# Patient Record
Sex: FEMALE | Race: WHITE | NOT HISPANIC OR LATINO
[De-identification: names, ages, dates, MRNs, and addresses within clinical notes are randomized per-mention and may not be internally consistent; named-entity substitution may affect disease eponyms.]

---

## 2017-02-22 ENCOUNTER — APPOINTMENT (OUTPATIENT)
Dept: NEUROLOGY | Facility: CLINIC | Age: 32
End: 2017-02-22

## 2017-02-22 VITALS
WEIGHT: 115 LBS | SYSTOLIC BLOOD PRESSURE: 95 MMHG | HEIGHT: 60 IN | DIASTOLIC BLOOD PRESSURE: 56 MMHG | BODY MASS INDEX: 22.58 KG/M2 | HEART RATE: 82 BPM

## 2017-02-22 RX ORDER — MAGNESIUM OXIDE 241.3 MG/1000MG
400 TABLET ORAL TWICE DAILY
Qty: 60 | Refills: 1 | Status: ACTIVE | COMMUNITY
Start: 2017-02-22 | End: 1900-01-01

## 2017-04-14 ENCOUNTER — APPOINTMENT (OUTPATIENT)
Dept: INTERNAL MEDICINE | Facility: CLINIC | Age: 32
End: 2017-04-14

## 2017-04-14 VITALS
HEIGHT: 60 IN | WEIGHT: 115 LBS | BODY MASS INDEX: 22.58 KG/M2 | SYSTOLIC BLOOD PRESSURE: 120 MMHG | HEART RATE: 80 BPM | DIASTOLIC BLOOD PRESSURE: 74 MMHG | RESPIRATION RATE: 17 BRPM | TEMPERATURE: 98.2 F | OXYGEN SATURATION: 98 %

## 2017-04-24 LAB
25(OH)D3 SERPL-MCNC: 19.3 NG/ML
ALBUMIN SERPL ELPH-MCNC: 4.4 G/DL
ALP BLD-CCNC: 45 U/L
ALT SERPL-CCNC: 9 U/L
ANION GAP SERPL CALC-SCNC: 15 MMOL/L
AST SERPL-CCNC: 11 U/L
BASOPHILS # BLD AUTO: 0.02 K/UL
BASOPHILS NFR BLD AUTO: 0.3 %
BILIRUB SERPL-MCNC: 0.3 MG/DL
BUN SERPL-MCNC: 13 MG/DL
CALCIUM SERPL-MCNC: 9.8 MG/DL
CHLORIDE SERPL-SCNC: 107 MMOL/L
CHOLEST SERPL-MCNC: 184 MG/DL
CHOLEST/HDLC SERPL: 3.2 RATIO
CO2 SERPL-SCNC: 21 MMOL/L
CREAT SERPL-MCNC: 0.63 MG/DL
EOSINOPHIL # BLD AUTO: 0.11 K/UL
EOSINOPHIL NFR BLD AUTO: 1.9 %
FOLATE SERPL-MCNC: >20 NG/ML
GLUCOSE SERPL-MCNC: 86 MG/DL
HBA1C MFR BLD HPLC: 5.3 %
HCT VFR BLD CALC: 34.7 %
HDLC SERPL-MCNC: 57 MG/DL
HGB BLD-MCNC: 11.5 G/DL
IMM GRANULOCYTES NFR BLD AUTO: 0.2 %
LDLC SERPL CALC-MCNC: 119 MG/DL
LYMPHOCYTES # BLD AUTO: 1.54 K/UL
LYMPHOCYTES NFR BLD AUTO: 26.6 %
MAN DIFF?: NORMAL
MCHC RBC-ENTMCNC: 28 PG
MCHC RBC-ENTMCNC: 33.1 GM/DL
MCV RBC AUTO: 84.6 FL
MONOCYTES # BLD AUTO: 0.57 K/UL
MONOCYTES NFR BLD AUTO: 9.9 %
NEUTROPHILS # BLD AUTO: 3.53 K/UL
NEUTROPHILS NFR BLD AUTO: 61.1 %
PLATELET # BLD AUTO: 353 K/UL
POTASSIUM SERPL-SCNC: 4.4 MMOL/L
PROT SERPL-MCNC: 6.8 G/DL
RBC # BLD: 4.1 M/UL
RBC # FLD: 12.8 %
SODIUM SERPL-SCNC: 143 MMOL/L
TRIGL SERPL-MCNC: 41 MG/DL
TSH SERPL-ACNC: 1.69 UIU/ML
VIT B12 SERPL-MCNC: 312 PG/ML
WBC # FLD AUTO: 5.78 K/UL

## 2018-05-09 ENCOUNTER — APPOINTMENT (OUTPATIENT)
Dept: NEUROLOGY | Facility: CLINIC | Age: 33
End: 2018-05-09
Payer: COMMERCIAL

## 2018-05-09 VITALS
SYSTOLIC BLOOD PRESSURE: 129 MMHG | HEIGHT: 60 IN | HEART RATE: 75 BPM | WEIGHT: 118 LBS | DIASTOLIC BLOOD PRESSURE: 76 MMHG | BODY MASS INDEX: 23.16 KG/M2

## 2018-05-09 PROCEDURE — 99214 OFFICE O/P EST MOD 30 MIN: CPT

## 2019-08-14 ENCOUNTER — APPOINTMENT (OUTPATIENT)
Dept: INTERNAL MEDICINE | Facility: CLINIC | Age: 34
End: 2019-08-14

## 2019-12-06 ENCOUNTER — EMERGENCY (EMERGENCY)
Facility: HOSPITAL | Age: 34
LOS: 1 days | End: 2019-12-06
Attending: STUDENT IN AN ORGANIZED HEALTH CARE EDUCATION/TRAINING PROGRAM
Payer: COMMERCIAL

## 2019-12-06 VITALS
TEMPERATURE: 98 F | OXYGEN SATURATION: 100 % | WEIGHT: 125 LBS | HEIGHT: 60 IN | DIASTOLIC BLOOD PRESSURE: 83 MMHG | HEART RATE: 83 BPM | SYSTOLIC BLOOD PRESSURE: 134 MMHG | RESPIRATION RATE: 18 BRPM

## 2019-12-06 LAB
ANION GAP SERPL CALC-SCNC: 13 MMOL/L — SIGNIFICANT CHANGE UP (ref 5–17)
APPEARANCE UR: ABNORMAL
BASOPHILS # BLD AUTO: 0.03 K/UL — SIGNIFICANT CHANGE UP (ref 0–0.2)
BASOPHILS NFR BLD AUTO: 0.4 % — SIGNIFICANT CHANGE UP (ref 0–2)
BILIRUB UR-MCNC: NEGATIVE — SIGNIFICANT CHANGE UP
BLD GP AB SCN SERPL QL: NEGATIVE — SIGNIFICANT CHANGE UP
BUN SERPL-MCNC: 7 MG/DL — SIGNIFICANT CHANGE UP (ref 7–23)
CALCIUM SERPL-MCNC: 9.7 MG/DL — SIGNIFICANT CHANGE UP (ref 8.4–10.5)
CHLORIDE SERPL-SCNC: 102 MMOL/L — SIGNIFICANT CHANGE UP (ref 96–108)
CO2 SERPL-SCNC: 21 MMOL/L — LOW (ref 22–31)
COLOR SPEC: ABNORMAL
CREAT SERPL-MCNC: 0.51 MG/DL — SIGNIFICANT CHANGE UP (ref 0.5–1.3)
DIFF PNL FLD: ABNORMAL
EOSINOPHIL # BLD AUTO: 0.08 K/UL — SIGNIFICANT CHANGE UP (ref 0–0.5)
EOSINOPHIL NFR BLD AUTO: 1 % — SIGNIFICANT CHANGE UP (ref 0–6)
GLUCOSE SERPL-MCNC: 129 MG/DL — HIGH (ref 70–99)
GLUCOSE UR QL: NEGATIVE — SIGNIFICANT CHANGE UP
HCG SERPL-ACNC: HIGH MIU/ML
HCG UR QL: NEGATIVE — SIGNIFICANT CHANGE UP
HCT VFR BLD CALC: 30.7 % — LOW (ref 34.5–45)
HGB BLD-MCNC: 10.5 G/DL — LOW (ref 11.5–15.5)
IMM GRANULOCYTES NFR BLD AUTO: 0.2 % — SIGNIFICANT CHANGE UP (ref 0–1.5)
KETONES UR-MCNC: NEGATIVE — SIGNIFICANT CHANGE UP
LEUKOCYTE ESTERASE UR-ACNC: ABNORMAL
LYMPHOCYTES # BLD AUTO: 1.91 K/UL — SIGNIFICANT CHANGE UP (ref 1–3.3)
LYMPHOCYTES # BLD AUTO: 23 % — SIGNIFICANT CHANGE UP (ref 13–44)
MCHC RBC-ENTMCNC: 27.9 PG — SIGNIFICANT CHANGE UP (ref 27–34)
MCHC RBC-ENTMCNC: 34.2 GM/DL — SIGNIFICANT CHANGE UP (ref 32–36)
MCV RBC AUTO: 81.6 FL — SIGNIFICANT CHANGE UP (ref 80–100)
MONOCYTES # BLD AUTO: 0.75 K/UL — SIGNIFICANT CHANGE UP (ref 0–0.9)
MONOCYTES NFR BLD AUTO: 9 % — SIGNIFICANT CHANGE UP (ref 2–14)
NEUTROPHILS # BLD AUTO: 5.53 K/UL — SIGNIFICANT CHANGE UP (ref 1.8–7.4)
NEUTROPHILS NFR BLD AUTO: 66.4 % — SIGNIFICANT CHANGE UP (ref 43–77)
NITRITE UR-MCNC: NEGATIVE — SIGNIFICANT CHANGE UP
NRBC # BLD: 0 /100 WBCS — SIGNIFICANT CHANGE UP (ref 0–0)
PH UR: 7 — SIGNIFICANT CHANGE UP (ref 5–8)
PLATELET # BLD AUTO: 368 K/UL — SIGNIFICANT CHANGE UP (ref 150–400)
POTASSIUM SERPL-MCNC: 3.5 MMOL/L — SIGNIFICANT CHANGE UP (ref 3.5–5.3)
POTASSIUM SERPL-SCNC: 3.5 MMOL/L — SIGNIFICANT CHANGE UP (ref 3.5–5.3)
PROT UR-MCNC: ABNORMAL
RBC # BLD: 3.76 M/UL — LOW (ref 3.8–5.2)
RBC # FLD: 12.8 % — SIGNIFICANT CHANGE UP (ref 10.3–14.5)
RH IG SCN BLD-IMP: POSITIVE — SIGNIFICANT CHANGE UP
SODIUM SERPL-SCNC: 136 MMOL/L — SIGNIFICANT CHANGE UP (ref 135–145)
SP GR SPEC: 1.01 — SIGNIFICANT CHANGE UP (ref 1.01–1.02)
UROBILINOGEN FLD QL: NEGATIVE — SIGNIFICANT CHANGE UP
WBC # BLD: 8.32 K/UL — SIGNIFICANT CHANGE UP (ref 3.8–10.5)
WBC # FLD AUTO: 8.32 K/UL — SIGNIFICANT CHANGE UP (ref 3.8–10.5)

## 2019-12-06 PROCEDURE — 99284 EMERGENCY DEPT VISIT MOD MDM: CPT

## 2019-12-06 NOTE — ED PROVIDER NOTE - CARE PLAN
Principal Discharge DX:	Vaginal bleeding before 22 weeks gestation  Secondary Diagnosis:	UTI (urinary tract infection)

## 2019-12-06 NOTE — ED PROVIDER NOTE - PATIENT PORTAL LINK FT
You can access the FollowMyHealth Patient Portal offered by [facility] by registering at the following website: [email or web address]. By joining [employer]’s FollowMyHealth portal, you will also be able to view your health information using other applications (apps) compatible with our system.

## 2019-12-06 NOTE — ED PROVIDER NOTE - PHYSICAL EXAMINATION
I have reviewed the triage vital signs. Const: Well-nourished, Well-developed, Eyes: no conjunctival injection and no scleral icterus ENMT: Moist mucus membranes, CVS: +S1/S2, radial/DP pulse 2+ bilaterally  RESP: Unlabored respiratory effort, Clear to auscultation bilaterally GI: Nontender/Nondistended soft abdomen, no CVA tenderness MSK: Extremities w/o deformity or ttp, Psych: Awake, Alert, & Orientedx3;  Appropriate mood and affect, cooperative

## 2019-12-06 NOTE — ED ADULT NURSE NOTE - OBJECTIVE STATEMENT
33 y/o female history of uterine polyp and blocked fallopian tube presents to the ED from home c/o vaginal bleeding. Patient states that she is ten weeks pregnant. . First pregnancy ended with miscarriage. Patient states that around 1700 today she had bleeding. Patient had another episode at 6-7 weeks that resolved on its own. Denies clots with discharge. Denies fever, chills, n/v, weakness, abd pain, diarrhea/constipation, numbness/tingling, urinary s/s. Patient A&Ox3, in no respiratory distress, and denies chest pain. Abdomen soft and non distended. Non tender to palpation.

## 2019-12-06 NOTE — ED PROVIDER NOTE - NSFOLLOWUPINSTRUCTIONS_ED_ALL_ED_FT
Please take the antibiotic that you were prescribed, cephalexin (Keflex), one tablet, two times per day, for 7 days. Please take as indicated on your prescription with respect to the warnings.     Please follow up with your OBGYN as soon as possible. If you do not have one, you can make an appointment at the following location:   [facility]  Address: [address] [place] [address], 1st Floor, [place] [address]  Phone: [phone]  Fax: [phone]     Please return to the emergency department if you experience worsening symptoms, or if you develop headache, neck stiffness, fever/chills, changes in vision, chest pain, shortness of breath, difficulty breathing, palpitations, lightheadedness, weakness, dizziness, numbness, tingling, abdominal pain, nausea, vomiting, diarrhea, changes in your urine, blood in the urine, painful urination, syncope (passing out), or for any other concerns.

## 2019-12-06 NOTE — ED PROVIDER NOTE - OBJECTIVE STATEMENT
34 year old female (, x10 weeks pregnant) with pmhx uterine polyp, blocked fallopian tube and no significant pshx presents to the ED c/o vaginal bleeding starting at 1700 today. Pt follows reproductive endocrinologist, Dr. Nye, who discharged the pt to ob recently. Had artificial insemination (IUI) conducted on 10/12. Pt had bled previously during the pregnancy between weeks x6-7 but has not been bleeding for the past x12 days. During the bleeding period, the pt was confined to her bed but has recently been allowed to return to work. Denies hematuria, fever, n/v, lightheadedness, CP, SOB, abdominal pain. Denies smoking, alcohol and drug use. Pt had a natural chemical pregnancy x several months ago, miscarried. Prior US was conducted by Dr. Nye to visualize the fetus during the bleeding period, fetus developing normally. Last check-up was on .

## 2019-12-06 NOTE — ED PROVIDER NOTE - CLINICAL SUMMARY MEDICAL DECISION MAKING FREE TEXT BOX
34 year old female (, x10 weeks pregnant) with artificial insemination presents to the ED c/o vaginal bleeding x3-4 hours, no clots. Has prior vaginal bleeding which self resolved. Pt has known intrauterine pregnancy. On exam, pt non-toxic appearing, no symptoms of anemia, will send off labs and obtain US and ob consult.

## 2019-12-07 VITALS
TEMPERATURE: 99 F | HEART RATE: 74 BPM | DIASTOLIC BLOOD PRESSURE: 70 MMHG | OXYGEN SATURATION: 99 % | RESPIRATION RATE: 18 BRPM | SYSTOLIC BLOOD PRESSURE: 105 MMHG

## 2019-12-07 PROCEDURE — 85027 COMPLETE CBC AUTOMATED: CPT

## 2019-12-07 PROCEDURE — 81025 URINE PREGNANCY TEST: CPT

## 2019-12-07 PROCEDURE — 80048 BASIC METABOLIC PNL TOTAL CA: CPT

## 2019-12-07 PROCEDURE — 76815 OB US LIMITED FETUS(S): CPT

## 2019-12-07 PROCEDURE — 86850 RBC ANTIBODY SCREEN: CPT

## 2019-12-07 PROCEDURE — 76801 OB US < 14 WKS SINGLE FETUS: CPT

## 2019-12-07 PROCEDURE — 76815 OB US LIMITED FETUS(S): CPT | Mod: 26

## 2019-12-07 PROCEDURE — 86900 BLOOD TYPING SEROLOGIC ABO: CPT

## 2019-12-07 PROCEDURE — 86901 BLOOD TYPING SEROLOGIC RH(D): CPT

## 2019-12-07 PROCEDURE — 76817 TRANSVAGINAL US OBSTETRIC: CPT | Mod: 26

## 2019-12-07 PROCEDURE — 81001 URINALYSIS AUTO W/SCOPE: CPT

## 2019-12-07 PROCEDURE — 84702 CHORIONIC GONADOTROPIN TEST: CPT

## 2019-12-07 PROCEDURE — 99284 EMERGENCY DEPT VISIT MOD MDM: CPT | Mod: 25

## 2019-12-07 RX ORDER — CEPHALEXIN 500 MG
1 CAPSULE ORAL
Qty: 14 | Refills: 0
Start: 2019-12-07 | End: 2019-12-13

## 2019-12-07 RX ORDER — CEPHALEXIN 500 MG
500 CAPSULE ORAL ONCE
Refills: 0 | Status: COMPLETED | OUTPATIENT
Start: 2019-12-07 | End: 2019-12-07

## 2019-12-07 RX ADMIN — Medication 500 MILLIGRAM(S): at 01:15

## 2019-12-11 PROBLEM — N84.0 POLYP OF CORPUS UTERI: Chronic | Status: ACTIVE | Noted: 2019-12-07

## 2019-12-30 ENCOUNTER — APPOINTMENT (OUTPATIENT)
Dept: ANTEPARTUM | Facility: CLINIC | Age: 34
End: 2019-12-30
Payer: COMMERCIAL

## 2019-12-30 ENCOUNTER — ASOB RESULT (OUTPATIENT)
Age: 34
End: 2019-12-30

## 2019-12-30 PROCEDURE — 36416 COLLJ CAPILLARY BLOOD SPEC: CPT

## 2019-12-30 PROCEDURE — 76801 OB US < 14 WKS SINGLE FETUS: CPT

## 2019-12-30 PROCEDURE — 76813 OB US NUCHAL MEAS 1 GEST: CPT

## 2020-01-03 ENCOUNTER — ASOB RESULT (OUTPATIENT)
Age: 35
End: 2020-01-03

## 2020-01-03 ENCOUNTER — APPOINTMENT (OUTPATIENT)
Dept: ANTEPARTUM | Facility: CLINIC | Age: 35
End: 2020-01-03
Payer: COMMERCIAL

## 2020-01-03 PROCEDURE — 76817 TRANSVAGINAL US OBSTETRIC: CPT

## 2020-01-10 ENCOUNTER — APPOINTMENT (OUTPATIENT)
Dept: NEUROLOGY | Facility: CLINIC | Age: 35
End: 2020-01-10
Payer: COMMERCIAL

## 2020-01-10 VITALS
BODY MASS INDEX: 24.54 KG/M2 | OXYGEN SATURATION: 99 % | HEIGHT: 60 IN | WEIGHT: 125 LBS | RESPIRATION RATE: 18 BRPM | DIASTOLIC BLOOD PRESSURE: 60 MMHG | SYSTOLIC BLOOD PRESSURE: 110 MMHG | HEART RATE: 93 BPM

## 2020-01-10 PROCEDURE — 99214 OFFICE O/P EST MOD 30 MIN: CPT

## 2020-01-10 NOTE — DISCUSSION/SUMMARY
[FreeTextEntry1] : 34 W who is here for followup of her migraines. She is currently doing well. We discussed possible interventions should she have migraines during the rest of her pregnancy.\par \par More than 50% of time spent on counseling and educate patient on differential, workup, disease course, and treatment/management. Education was provided to the patient during this encounter. All questions and concerns were answered and addressed in detail. The patient verbalized understanding and agreed to plan. Patient was advised to continue to monitor for neurologic symptoms and to notify my office or go to the nearest emergency room if there are any changes. \par Side effects of the above medications were discussed in detail including but not limited to applicable black box warning and teratogenicity as appropriate. \par Patient was advised to bring previous records to my office. \par \par

## 2020-01-10 NOTE — PHYSICAL EXAM
[General Appearance - Well Developed] : well developed [General Appearance - Alert] : alert [Person] : oriented to person [Oriented To Time, Place, And Person] : oriented to person, place, and time [Place] : oriented to place [Time] : oriented to time [Short Term Intact] : short term memory intact [Span Intact] : the attention span was normal [Fluency] : fluency intact [Naming Objects] : no difficulty naming common objects [Cranial Nerves Oculomotor (III)] : extraocular motion intact [Cranial Nerves Optic (II)] : visual acuity intact bilaterally,  visual fields full to confrontation, pupils equal round and reactive to light [Current Events] : adequate knowledge of current events [Cranial Nerves Vestibulocochlear (VIII)] : hearing was intact bilaterally [Cranial Nerves Trigeminal (V)] : facial sensation intact symmetrically [Cranial Nerves Facial (VII)] : face symmetrical [Cranial Nerves Glossopharyngeal (IX)] : tongue and palate midline [Cranial Nerves Accessory (XI - Cranial And Spinal)] : head turning and shoulder shrug symmetric [Cranial Nerves Hypoglossal (XII)] : there was no tongue deviation with protrusion [Motor Strength] : muscle strength was normal in all four extremities [Motor Tone] : muscle tone was normal in all four extremities [Sensation Tactile Decrease] : light touch was intact [Coordination - Dysmetria Impaired Finger-to-Nose Bilateral] : not present [2+] : Biceps left 2+ [Full Pulse] : the pedal pulses are present [Hearing Threshold Finger Rub Not [place]] : hearing was normal [Extraocular Movements] : extraocular movements were intact [] : no rash [Abnormal Walk] : normal gait

## 2020-01-10 NOTE — HISTORY OF PRESENT ILLNESS
[FreeTextEntry1] : 34 W who has seen Dr. Blackmon in Jan. She has symptoms suggestive of migraine without aura. She never tried the topamax as she's not interested in a daily medication. Her migraines occur the first day of her menses. Sometimes the Excedrin migraine works. She thinks stress also worsens her migraines. At times she vomits and at other times the migraine lasts all day. Location: between her eyebrows. \par \par she didn't bring the MRI report that was done recently. \par \par interval history: She tried the mag oxide but it didn't help the menstrual migraines. She stopped it because she thought it was hurting her stomach. in hindsight, it may have been the excedrin migraine that she's using. She still has headaches about 2-3 a week. \par \par interval history: She is currently 14 weeks pregnant with her first child. She has been fine with exception of a migraine that lasted 5 days. We discussed safe options during pregnancy if she has migraines. \par

## 2020-01-22 ENCOUNTER — ASOB RESULT (OUTPATIENT)
Age: 35
End: 2020-01-22

## 2020-01-22 ENCOUNTER — APPOINTMENT (OUTPATIENT)
Dept: ANTEPARTUM | Facility: CLINIC | Age: 35
End: 2020-01-22
Payer: COMMERCIAL

## 2020-01-22 PROCEDURE — 76817 TRANSVAGINAL US OBSTETRIC: CPT

## 2020-01-22 PROCEDURE — 76805 OB US >/= 14 WKS SNGL FETUS: CPT

## 2020-02-21 ENCOUNTER — APPOINTMENT (OUTPATIENT)
Dept: OTOLARYNGOLOGY | Facility: CLINIC | Age: 35
End: 2020-02-21
Payer: COMMERCIAL

## 2020-02-21 VITALS
HEART RATE: 82 BPM | WEIGHT: 125 LBS | DIASTOLIC BLOOD PRESSURE: 65 MMHG | HEIGHT: 60 IN | SYSTOLIC BLOOD PRESSURE: 115 MMHG | BODY MASS INDEX: 24.54 KG/M2

## 2020-02-21 PROCEDURE — 99203 OFFICE O/P NEW LOW 30 MIN: CPT | Mod: 25

## 2020-02-21 PROCEDURE — 92567 TYMPANOMETRY: CPT

## 2020-02-21 PROCEDURE — 92557 COMPREHENSIVE HEARING TEST: CPT

## 2020-02-24 NOTE — HISTORY OF PRESENT ILLNESS
[de-identified] : 36yo female, 20 weeks pregnant, who had URI. She went to urgent care, was told she had fluid and given saline. She saw her ob who advised f/u again with urgent care and ok to take claritin. She notes that she saw an ENT and was put amoxicillin twice a day for ten days. She feels her hearing is muffled and she is worried about this. Some mild nasal congestion still but much better. Using neti pot, many showers and notes this is improving.

## 2020-02-24 NOTE — PHYSICAL EXAM
[de-identified] : right complete imelda effusion, left effusion with bubbles [de-identified] : + walter [Midline] : trachea located in midline position [Normal] : no rashes

## 2020-02-24 NOTE — CONSULT LETTER
[Dear  ___] : Dear  [unfilled], [Consult Letter:] : I had the pleasure of evaluating your patient, [unfilled]. [Consult Closing:] : Thank you very much for allowing me to participate in the care of this patient.  If you have any questions, please do not hesitate to contact me. [Please see my note below.] : Please see my note below. [FreeTextEntry3] : Dionna Valdez MD\par Otolaryngology and Cranial Base Surgery\par Attending Physician - Department of Otolaryngology and Head & Neck Surgery\par [facility]\par \par [employer] [facility]  [Sincerely,] : Sincerely,

## 2020-02-24 NOTE — ASSESSMENT
[FreeTextEntry1] : b/l BRISEIDA/CHL s/p URI:\par - reassured the natural progression of effusion after URI and that would continue to recommend conservative management for now\par - reassured the hearing loss is conductive bc of the fluid and should resolve once fluid clears\par - continue neti pot and antihistamine that her OB recommended\par - f/u in 2-3 weeks to recheck and if persistent effusion can consider in office myringotomy +/- tube as long as her OB oks use of phenol or tetracaine\par

## 2020-02-26 ENCOUNTER — ASOB RESULT (OUTPATIENT)
Age: 35
End: 2020-02-26

## 2020-02-26 ENCOUNTER — APPOINTMENT (OUTPATIENT)
Dept: ANTEPARTUM | Facility: CLINIC | Age: 35
End: 2020-02-26
Payer: COMMERCIAL

## 2020-02-26 PROCEDURE — 76817 TRANSVAGINAL US OBSTETRIC: CPT | Mod: 59

## 2020-02-26 PROCEDURE — 76811 OB US DETAILED SNGL FETUS: CPT

## 2020-03-04 ENCOUNTER — APPOINTMENT (OUTPATIENT)
Dept: ANTEPARTUM | Facility: CLINIC | Age: 35
End: 2020-03-04
Payer: COMMERCIAL

## 2020-03-04 ENCOUNTER — ASOB RESULT (OUTPATIENT)
Age: 35
End: 2020-03-04

## 2020-03-04 PROCEDURE — 76817 TRANSVAGINAL US OBSTETRIC: CPT

## 2020-03-04 PROCEDURE — 76815 OB US LIMITED FETUS(S): CPT

## 2020-03-11 ENCOUNTER — APPOINTMENT (OUTPATIENT)
Dept: OTOLARYNGOLOGY | Facility: CLINIC | Age: 35
End: 2020-03-11

## 2020-03-11 ENCOUNTER — APPOINTMENT (OUTPATIENT)
Dept: OTOLARYNGOLOGY | Facility: CLINIC | Age: 35
End: 2020-03-11
Payer: COMMERCIAL

## 2020-03-11 VITALS
SYSTOLIC BLOOD PRESSURE: 91 MMHG | BODY MASS INDEX: 24.54 KG/M2 | DIASTOLIC BLOOD PRESSURE: 61 MMHG | HEIGHT: 60 IN | HEART RATE: 64 BPM | WEIGHT: 125 LBS

## 2020-03-11 PROCEDURE — 99213 OFFICE O/P EST LOW 20 MIN: CPT | Mod: 25

## 2020-03-11 PROCEDURE — 92557 COMPREHENSIVE HEARING TEST: CPT

## 2020-03-11 PROCEDURE — 92567 TYMPANOMETRY: CPT

## 2020-03-11 PROCEDURE — G0268 REMOVAL OF IMPACTED WAX MD: CPT

## 2020-03-11 NOTE — ASSESSMENT
[FreeTextEntry1] : BRISEIDA - resolved.\par - Continue sinus wash for pregnancy rhinitis\par - reviewed audio with patient and reassured that hearing back to normal\par - f/u after pregnancy if any symptoms persist or if new symptoms arise

## 2020-03-11 NOTE — HISTORY OF PRESENT ILLNESS
[de-identified] : 34 y/o F, 23 weeks pregnant.  At last visit had b/l BRISEIDA with conductive loss and type B tymps.   Using sinus wash.  Notes b/l ears feel much better.  Hearing is much improved.  Notes now can pop her ears.  Also now has left pulsatile tinnitus.

## 2020-03-31 ENCOUNTER — APPOINTMENT (OUTPATIENT)
Dept: ANTEPARTUM | Facility: CLINIC | Age: 35
End: 2020-03-31
Payer: COMMERCIAL

## 2020-03-31 ENCOUNTER — ASOB RESULT (OUTPATIENT)
Age: 35
End: 2020-03-31

## 2020-03-31 PROCEDURE — 76816 OB US FOLLOW-UP PER FETUS: CPT

## 2020-04-28 ENCOUNTER — APPOINTMENT (OUTPATIENT)
Dept: ANTEPARTUM | Facility: CLINIC | Age: 35
End: 2020-04-28
Payer: COMMERCIAL

## 2020-04-28 ENCOUNTER — ASOB RESULT (OUTPATIENT)
Age: 35
End: 2020-04-28

## 2020-04-28 PROCEDURE — 76819 FETAL BIOPHYS PROFIL W/O NST: CPT

## 2020-04-28 PROCEDURE — 76816 OB US FOLLOW-UP PER FETUS: CPT

## 2020-05-26 ENCOUNTER — APPOINTMENT (OUTPATIENT)
Dept: ANTEPARTUM | Facility: CLINIC | Age: 35
End: 2020-05-26
Payer: COMMERCIAL

## 2020-05-26 ENCOUNTER — ASOB RESULT (OUTPATIENT)
Age: 35
End: 2020-05-26

## 2020-05-26 PROCEDURE — 76816 OB US FOLLOW-UP PER FETUS: CPT

## 2020-06-27 ENCOUNTER — TRANSCRIPTION ENCOUNTER (OUTPATIENT)
Age: 35
End: 2020-06-27

## 2020-06-28 ENCOUNTER — INPATIENT (INPATIENT)
Facility: HOSPITAL | Age: 35
LOS: 2 days | Discharge: HOME | End: 2020-07-01
Attending: OBSTETRICS & GYNECOLOGY | Admitting: OBSTETRICS & GYNECOLOGY
Payer: COMMERCIAL

## 2020-06-28 VITALS — WEIGHT: 127.87 LBS | HEIGHT: 60 IN

## 2020-06-28 DIAGNOSIS — Z34.80 ENCOUNTER FOR SUPERVISION OF OTHER NORMAL PREGNANCY, UNSPECIFIED TRIMESTER: ICD-10-CM

## 2020-06-28 DIAGNOSIS — O26.899 OTHER SPECIFIED PREGNANCY RELATED CONDITIONS, UNSPECIFIED TRIMESTER: ICD-10-CM

## 2020-06-28 DIAGNOSIS — Z3A.00 WEEKS OF GESTATION OF PREGNANCY NOT SPECIFIED: ICD-10-CM

## 2020-06-28 LAB
BASOPHILS # BLD AUTO: 0.02 K/UL — SIGNIFICANT CHANGE UP (ref 0–0.2)
BASOPHILS NFR BLD AUTO: 0.2 % — SIGNIFICANT CHANGE UP (ref 0–2)
BLD GP AB SCN SERPL QL: NEGATIVE — SIGNIFICANT CHANGE UP
EOSINOPHIL # BLD AUTO: 0.06 K/UL — SIGNIFICANT CHANGE UP (ref 0–0.5)
EOSINOPHIL NFR BLD AUTO: 0.5 % — SIGNIFICANT CHANGE UP (ref 0–6)
HCT VFR BLD CALC: 31.5 % — LOW (ref 34.5–45)
HGB BLD-MCNC: 11.3 G/DL — LOW (ref 11.5–15.5)
IMM GRANULOCYTES NFR BLD AUTO: 0.6 % — SIGNIFICANT CHANGE UP (ref 0–1.5)
LYMPHOCYTES # BLD AUTO: 16.8 % — SIGNIFICANT CHANGE UP (ref 13–44)
LYMPHOCYTES # BLD AUTO: 2.09 K/UL — SIGNIFICANT CHANGE UP (ref 1–3.3)
MCHC RBC-ENTMCNC: 31.4 PG — SIGNIFICANT CHANGE UP (ref 27–34)
MCHC RBC-ENTMCNC: 35.9 GM/DL — SIGNIFICANT CHANGE UP (ref 32–36)
MCV RBC AUTO: 87.5 FL — SIGNIFICANT CHANGE UP (ref 80–100)
MONOCYTES # BLD AUTO: 1.41 K/UL — HIGH (ref 0–0.9)
MONOCYTES NFR BLD AUTO: 11.4 % — SIGNIFICANT CHANGE UP (ref 2–14)
NEUTROPHILS # BLD AUTO: 8.76 K/UL — HIGH (ref 1.8–7.4)
NEUTROPHILS NFR BLD AUTO: 70.5 % — SIGNIFICANT CHANGE UP (ref 43–77)
NRBC # BLD: 0 /100 WBCS — SIGNIFICANT CHANGE UP (ref 0–0)
PLATELET # BLD AUTO: 212 K/UL — SIGNIFICANT CHANGE UP (ref 150–400)
RBC # BLD: 3.6 M/UL — LOW (ref 3.8–5.2)
RBC # FLD: 14.2 % — SIGNIFICANT CHANGE UP (ref 10.3–14.5)
RH IG SCN BLD-IMP: POSITIVE — SIGNIFICANT CHANGE UP
SARS-COV-2 RNA SPEC QL NAA+PROBE: SIGNIFICANT CHANGE UP
WBC # BLD: 12.41 K/UL — HIGH (ref 3.8–10.5)
WBC # FLD AUTO: 12.41 K/UL — HIGH (ref 3.8–10.5)

## 2020-06-28 RX ORDER — SODIUM CHLORIDE 9 MG/ML
1000 INJECTION, SOLUTION INTRAVENOUS
Refills: 0 | Status: DISCONTINUED | OUTPATIENT
Start: 2020-06-28 | End: 2020-06-29

## 2020-06-28 RX ORDER — CITRIC ACID/SODIUM CITRATE 300-500 MG
15 SOLUTION, ORAL ORAL EVERY 6 HOURS
Refills: 0 | Status: DISCONTINUED | OUTPATIENT
Start: 2020-06-28 | End: 2020-06-29

## 2020-06-28 RX ORDER — OXYTOCIN 10 UNIT/ML
333.33 VIAL (ML) INJECTION
Qty: 20 | Refills: 0 | Status: DISCONTINUED | OUTPATIENT
Start: 2020-06-28 | End: 2020-06-29

## 2020-06-28 RX ADMIN — SODIUM CHLORIDE 125 MILLILITER(S): 9 INJECTION, SOLUTION INTRAVENOUS at 21:55

## 2020-06-28 RX ADMIN — SODIUM CHLORIDE 125 MILLILITER(S): 9 INJECTION, SOLUTION INTRAVENOUS at 21:54

## 2020-06-28 NOTE — PRE-ANESTHESIA EVALUATION ADULT - NSANTHOSAYNRD_GEN_A_CORE
No. ANASTASIIA screening performed.  STOP BANG Legend: 0-2 = LOW Risk; 3-4 = INTERMEDIATE Risk; 5-8 = HIGH Risk

## 2020-06-29 ENCOUNTER — TRANSCRIPTION ENCOUNTER (OUTPATIENT)
Age: 35
End: 2020-06-29

## 2020-06-29 LAB
HCT VFR BLD CALC: 28.5 % — LOW (ref 34.5–45)
HGB BLD-MCNC: 9.4 G/DL — LOW (ref 11.5–15.5)
MCHC RBC-ENTMCNC: 29.3 PG — SIGNIFICANT CHANGE UP (ref 27–34)
MCHC RBC-ENTMCNC: 33 GM/DL — SIGNIFICANT CHANGE UP (ref 32–36)
MCV RBC AUTO: 88.8 FL — SIGNIFICANT CHANGE UP (ref 80–100)
NRBC # BLD: 0 /100 WBCS — SIGNIFICANT CHANGE UP (ref 0–0)
PLATELET # BLD AUTO: 236 K/UL — SIGNIFICANT CHANGE UP (ref 150–400)
RBC # BLD: 3.21 M/UL — LOW (ref 3.8–5.2)
RBC # FLD: 13.7 % — SIGNIFICANT CHANGE UP (ref 10.3–14.5)
SARS-COV-2 IGG SERPL QL IA: NEGATIVE — SIGNIFICANT CHANGE UP
SARS-COV-2 IGM SERPL IA-ACNC: 0.37 RATIO — SIGNIFICANT CHANGE UP
T PALLIDUM AB TITR SER: NEGATIVE — SIGNIFICANT CHANGE UP
WBC # BLD: 19.66 K/UL — HIGH (ref 3.8–10.5)
WBC # FLD AUTO: 19.66 K/UL — HIGH (ref 3.8–10.5)

## 2020-06-29 PROCEDURE — 88307 TISSUE EXAM BY PATHOLOGIST: CPT | Mod: 26

## 2020-06-29 RX ORDER — OXYCODONE HYDROCHLORIDE 5 MG/1
5 TABLET ORAL ONCE
Refills: 0 | Status: DISCONTINUED | OUTPATIENT
Start: 2020-06-29 | End: 2020-07-01

## 2020-06-29 RX ORDER — DEXAMETHASONE 0.5 MG/5ML
4 ELIXIR ORAL EVERY 6 HOURS
Refills: 0 | Status: DISCONTINUED | OUTPATIENT
Start: 2020-06-29 | End: 2020-07-01

## 2020-06-29 RX ORDER — TETANUS TOXOID, REDUCED DIPHTHERIA TOXOID AND ACELLULAR PERTUSSIS VACCINE, ADSORBED 5; 2.5; 8; 8; 2.5 [IU]/.5ML; [IU]/.5ML; UG/.5ML; UG/.5ML; UG/.5ML
0.5 SUSPENSION INTRAMUSCULAR ONCE
Refills: 0 | Status: DISCONTINUED | OUTPATIENT
Start: 2020-06-29 | End: 2020-07-01

## 2020-06-29 RX ORDER — HEPARIN SODIUM 5000 [USP'U]/ML
5000 INJECTION INTRAVENOUS; SUBCUTANEOUS EVERY 12 HOURS
Refills: 0 | Status: DISCONTINUED | OUTPATIENT
Start: 2020-06-29 | End: 2020-07-01

## 2020-06-29 RX ORDER — IBUPROFEN 200 MG
600 TABLET ORAL EVERY 6 HOURS
Refills: 0 | Status: COMPLETED | OUTPATIENT
Start: 2020-06-29 | End: 2021-05-28

## 2020-06-29 RX ORDER — OXYCODONE HYDROCHLORIDE 5 MG/1
10 TABLET ORAL
Refills: 0 | Status: DISCONTINUED | OUTPATIENT
Start: 2020-06-29 | End: 2020-06-29

## 2020-06-29 RX ORDER — ONDANSETRON 8 MG/1
4 TABLET, FILM COATED ORAL EVERY 6 HOURS
Refills: 0 | Status: DISCONTINUED | OUTPATIENT
Start: 2020-06-29 | End: 2020-07-01

## 2020-06-29 RX ORDER — MAGNESIUM HYDROXIDE 400 MG/1
30 TABLET, CHEWABLE ORAL
Refills: 0 | Status: DISCONTINUED | OUTPATIENT
Start: 2020-06-29 | End: 2020-07-01

## 2020-06-29 RX ORDER — KETOROLAC TROMETHAMINE 30 MG/ML
30 SYRINGE (ML) INJECTION EVERY 6 HOURS
Refills: 0 | Status: DISCONTINUED | OUTPATIENT
Start: 2020-06-29 | End: 2020-06-30

## 2020-06-29 RX ORDER — OXYTOCIN 10 UNIT/ML
333.33 VIAL (ML) INJECTION
Qty: 20 | Refills: 0 | Status: DISCONTINUED | OUTPATIENT
Start: 2020-06-29 | End: 2020-07-01

## 2020-06-29 RX ORDER — NALOXONE HYDROCHLORIDE 4 MG/.1ML
0.1 SPRAY NASAL
Refills: 0 | Status: DISCONTINUED | OUTPATIENT
Start: 2020-06-29 | End: 2020-07-01

## 2020-06-29 RX ORDER — IBUPROFEN 200 MG
1 TABLET ORAL
Qty: 0 | Refills: 0 | DISCHARGE
Start: 2020-06-29

## 2020-06-29 RX ORDER — OXYCODONE HYDROCHLORIDE 5 MG/1
5 TABLET ORAL
Refills: 0 | Status: DISCONTINUED | OUTPATIENT
Start: 2020-06-29 | End: 2020-06-29

## 2020-06-29 RX ORDER — SIMETHICONE 80 MG/1
80 TABLET, CHEWABLE ORAL EVERY 4 HOURS
Refills: 0 | Status: DISCONTINUED | OUTPATIENT
Start: 2020-06-29 | End: 2020-07-01

## 2020-06-29 RX ORDER — ACETAMINOPHEN 500 MG
3 TABLET ORAL
Qty: 0 | Refills: 0 | DISCHARGE
Start: 2020-06-29

## 2020-06-29 RX ORDER — SIMETHICONE 80 MG/1
1 TABLET, CHEWABLE ORAL
Qty: 0 | Refills: 0 | DISCHARGE
Start: 2020-06-29

## 2020-06-29 RX ORDER — SODIUM CHLORIDE 9 MG/ML
1000 INJECTION, SOLUTION INTRAVENOUS
Refills: 0 | Status: DISCONTINUED | OUTPATIENT
Start: 2020-06-29 | End: 2020-07-01

## 2020-06-29 RX ORDER — MORPHINE SULFATE 50 MG/1
2 CAPSULE, EXTENDED RELEASE ORAL ONCE
Refills: 0 | Status: DISCONTINUED | OUTPATIENT
Start: 2020-06-29 | End: 2020-06-30

## 2020-06-29 RX ORDER — DIPHENHYDRAMINE HCL 50 MG
25 CAPSULE ORAL EVERY 6 HOURS
Refills: 0 | Status: DISCONTINUED | OUTPATIENT
Start: 2020-06-29 | End: 2020-07-01

## 2020-06-29 RX ORDER — LANOLIN
1 OINTMENT (GRAM) TOPICAL EVERY 6 HOURS
Refills: 0 | Status: DISCONTINUED | OUTPATIENT
Start: 2020-06-29 | End: 2020-07-01

## 2020-06-29 RX ORDER — ACETAMINOPHEN 500 MG
975 TABLET ORAL
Refills: 0 | Status: DISCONTINUED | OUTPATIENT
Start: 2020-06-29 | End: 2020-07-01

## 2020-06-29 RX ORDER — OXYCODONE HYDROCHLORIDE 5 MG/1
5 TABLET ORAL
Refills: 0 | Status: DISCONTINUED | OUTPATIENT
Start: 2020-06-29 | End: 2020-07-01

## 2020-06-29 RX ADMIN — Medication 30 MILLIGRAM(S): at 05:00

## 2020-06-29 RX ADMIN — Medication 975 MILLIGRAM(S): at 20:55

## 2020-06-29 RX ADMIN — SODIUM CHLORIDE 125 MILLILITER(S): 9 INJECTION, SOLUTION INTRAVENOUS at 02:24

## 2020-06-29 RX ADMIN — Medication 1000 MILLIUNIT(S)/MIN: at 05:55

## 2020-06-29 RX ADMIN — Medication 30 MILLIGRAM(S): at 18:22

## 2020-06-29 RX ADMIN — Medication 975 MILLIGRAM(S): at 15:12

## 2020-06-29 RX ADMIN — Medication 30 MILLIGRAM(S): at 12:12

## 2020-06-29 RX ADMIN — HEPARIN SODIUM 5000 UNIT(S): 5000 INJECTION INTRAVENOUS; SUBCUTANEOUS at 16:40

## 2020-06-29 NOTE — DISCHARGE NOTE OB - CARE PLAN
Principal Discharge DX:	 delivery delivered  Goal:	recovery  Assessment and plan of treatment:	diet and activities as discussed and tolerated;  please call office to schedule postpartum appointment 5 weeks after delivery or earlier if needed

## 2020-06-29 NOTE — DISCHARGE NOTE OB - HOSPITAL COURSE
Pt underwent a primary  section please refer to delivery summary and operative report for details.  Pt did well postpartum.  She was voiding, ambulating and tolerating regular diet.  Her pain is well controlled and she remained afebrile.    Pt was discharged home on POD 2 in stable condition Pt underwent a primary  section please refer to delivery summary and operative report for details.  Pt did well postpartum.  She was voiding, ambulating and tolerating regular diet.  Her pain is well controlled and she remained afebrile.  Hgb 7.2 on POD #1, then increased to 7.5 on POD#2. She remained with out any symptoms of anemia.  Pt was discharged home on POD 2 in stable condition

## 2020-06-29 NOTE — DISCHARGE NOTE OB - MEDICATION SUMMARY - MEDICATIONS TO TAKE
I will START or STAY ON the medications listed below when I get home from the hospital:    ibuprofen 600 mg oral tablet  -- 1 tab(s) by mouth every 6 hours  -- Indication: For pain    acetaminophen 325 mg oral tablet  -- 3 tab(s) by mouth   -- Indication: For pain    Prenatal Multivitamins oral tablet  -- Indication: For postpartum    simethicone 80 mg oral tablet, chewable  -- 1 tab(s) by mouth every 4 hours, As needed, Gas  -- Indication: For gas pain

## 2020-06-29 NOTE — DISCHARGE NOTE OB - PLAN OF CARE
recovery diet and activities as discussed and tolerated;  please call office to schedule postpartum appointment 5 weeks after delivery or earlier if needed

## 2020-06-29 NOTE — DISCHARGE NOTE OB - PATIENT PORTAL LINK FT
You can access the FollowMyHealth Patient Portal offered by [facility] by registering at the following website: [email or web address]. By joining [employer]’s FollowMyHealth portal, you will also be able to view your health information using other applications (apps) compatible with our system.

## 2020-06-30 LAB
BASOPHILS # BLD AUTO: 0.02 K/UL — SIGNIFICANT CHANGE UP (ref 0–0.2)
BASOPHILS NFR BLD AUTO: 0.1 % — SIGNIFICANT CHANGE UP (ref 0–2)
EOSINOPHIL # BLD AUTO: 0.12 K/UL — SIGNIFICANT CHANGE UP (ref 0–0.5)
EOSINOPHIL NFR BLD AUTO: 0.8 % — SIGNIFICANT CHANGE UP (ref 0–6)
HCT VFR BLD CALC: 21.2 % — LOW (ref 34.5–45)
HGB BLD-MCNC: 7.2 G/DL — LOW (ref 11.5–15.5)
IMM GRANULOCYTES NFR BLD AUTO: 0.5 % — SIGNIFICANT CHANGE UP (ref 0–1.5)
LYMPHOCYTES # BLD AUTO: 13.5 % — SIGNIFICANT CHANGE UP (ref 13–44)
LYMPHOCYTES # BLD AUTO: 2.04 K/UL — SIGNIFICANT CHANGE UP (ref 1–3.3)
MCHC RBC-ENTMCNC: 30 PG — SIGNIFICANT CHANGE UP (ref 27–34)
MCHC RBC-ENTMCNC: 34 GM/DL — SIGNIFICANT CHANGE UP (ref 32–36)
MCV RBC AUTO: 88.3 FL — SIGNIFICANT CHANGE UP (ref 80–100)
MONOCYTES # BLD AUTO: 1.09 K/UL — HIGH (ref 0–0.9)
MONOCYTES NFR BLD AUTO: 7.2 % — SIGNIFICANT CHANGE UP (ref 2–14)
NEUTROPHILS # BLD AUTO: 11.76 K/UL — HIGH (ref 1.8–7.4)
NEUTROPHILS NFR BLD AUTO: 77.9 % — HIGH (ref 43–77)
NRBC # BLD: 0 /100 WBCS — SIGNIFICANT CHANGE UP (ref 0–0)
PLATELET # BLD AUTO: 160 K/UL — SIGNIFICANT CHANGE UP (ref 150–400)
RBC # BLD: 2.4 M/UL — LOW (ref 3.8–5.2)
RBC # FLD: 14.2 % — SIGNIFICANT CHANGE UP (ref 10.3–14.5)
WBC # BLD: 15.1 K/UL — HIGH (ref 3.8–10.5)
WBC # FLD AUTO: 15.1 K/UL — HIGH (ref 3.8–10.5)

## 2020-06-30 RX ORDER — IBUPROFEN 200 MG
600 TABLET ORAL EVERY 6 HOURS
Refills: 0 | Status: DISCONTINUED | OUTPATIENT
Start: 2020-06-30 | End: 2020-07-01

## 2020-06-30 RX ORDER — SENNA PLUS 8.6 MG/1
2 TABLET ORAL AT BEDTIME
Refills: 0 | Status: DISCONTINUED | OUTPATIENT
Start: 2020-06-30 | End: 2020-07-01

## 2020-06-30 RX ORDER — ASCORBIC ACID 60 MG
500 TABLET,CHEWABLE ORAL THREE TIMES A DAY
Refills: 0 | Status: DISCONTINUED | OUTPATIENT
Start: 2020-06-30 | End: 2020-07-01

## 2020-06-30 RX ORDER — FERROUS SULFATE 325(65) MG
325 TABLET ORAL THREE TIMES A DAY
Refills: 0 | Status: DISCONTINUED | OUTPATIENT
Start: 2020-06-30 | End: 2020-07-01

## 2020-06-30 RX ADMIN — Medication 500 MILLIGRAM(S): at 14:37

## 2020-06-30 RX ADMIN — Medication 30 MILLIGRAM(S): at 06:02

## 2020-06-30 RX ADMIN — Medication 600 MILLIGRAM(S): at 13:05

## 2020-06-30 RX ADMIN — HEPARIN SODIUM 5000 UNIT(S): 5000 INJECTION INTRAVENOUS; SUBCUTANEOUS at 06:01

## 2020-06-30 RX ADMIN — Medication 975 MILLIGRAM(S): at 21:39

## 2020-06-30 RX ADMIN — Medication 975 MILLIGRAM(S): at 10:19

## 2020-06-30 RX ADMIN — SENNA PLUS 2 TABLET(S): 8.6 TABLET ORAL at 21:39

## 2020-06-30 RX ADMIN — Medication 325 MILLIGRAM(S): at 21:39

## 2020-06-30 RX ADMIN — Medication 30 MILLIGRAM(S): at 00:08

## 2020-06-30 RX ADMIN — Medication 975 MILLIGRAM(S): at 02:49

## 2020-06-30 RX ADMIN — Medication 1 TABLET(S): at 14:36

## 2020-06-30 RX ADMIN — Medication 975 MILLIGRAM(S): at 14:37

## 2020-06-30 RX ADMIN — Medication 325 MILLIGRAM(S): at 14:36

## 2020-06-30 RX ADMIN — HEPARIN SODIUM 5000 UNIT(S): 5000 INJECTION INTRAVENOUS; SUBCUTANEOUS at 18:02

## 2020-06-30 RX ADMIN — Medication 500 MILLIGRAM(S): at 21:39

## 2020-06-30 NOTE — PROGRESS NOTE ADULT - ASSESSMENT
35y  POD # 1 S/P  primary   section, doing well.    PMHx:  Uterine polyp s/p polypectomy  Current Issues: None

## 2020-06-30 NOTE — PROGRESS NOTE ADULT - SUBJECTIVE AND OBJECTIVE BOX
Postpartum Note-  Section POD#1    Allergies  No Known Allergies    Intolerances  Denies    Rubella: Immune                      RPR: Nonreactive                    Blood Type: A+    S:Patient is a  35y     POD#1 S/P  primary C/Sec  Patient w/o complaints, pain is controlled.  Pt is OOB, tolerating PO, passing flatus. Lochia WNL.   Feeding: Breast feeding    O:  Vital Signs Last 24 Hrs  T(C): 36.5 (2020 06:14), Max: 37.2 (2020 08:45)  T(F): 97.7 (2020 06:14), Max: 98.9 (2020 08:45)  HR: 73 (2020 06:14) (67 - 73)  BP: 96/59 (2020 06:14) (96/59 - 116/77)  BP(mean): --  RR: 18 (2020 06:14) (18 - 18)  SpO2: 98% (2020 06:14) (98% - 98%)  I&O's Summary    2020 07:01  -  2020 07:00  --------------------------------------------------------  IN: 0 mL / OUT: 1100 mL / NET: -1100 mL        Gen: NAD  CV: rrr s1s2, CTABL  Abdomen: Soft, nontender, non-distended, fundus firm.  Bowel sounds x 4 quadrants  Incision: Clean, dry, and intact.  Negative erythema/edema/ecchymosis   Sub Q  Lochia WNL  Ext: PAS in place. Negative Homans B/L.  Pedal pulses palpated B/L    LABS:                          7.2    15.10 )-----------( 160      ( 2020 06:12 )             21.2       A/P:  35y  POD # 1 S/P  primary   section, doing well.    PMHx:  Uterine polyp s/p polypectomy  Current Issues: None    Increase OOB  Renew IVF  Renew PCEA  DVT ppx  Dressing removed  Due to void  Regular diet  AM CBC  Routine Postpartum/Post-op care    Liza Mireles PA-C

## 2020-06-30 NOTE — CHART NOTE - NSCHARTNOTEFT_GEN_A_CORE
PA NOTE     POD#1    Vital Signs Last 24 Hrs  T(C): 37 (2020 08:30), Max: 37 (2020 17:00)  T(F): 98.6 (2020 08:30), Max: 98.6 (2020 17:00)  HR: 70 (2020 08:30) (67 - 73)  BP: 91/57 (2020 08:30) (91/57 - 113/71)  BP(mean): --  RR: 18 (2020 08:30) (18 - 18)  SpO2: 98% (2020 06:14) (98% - 98%)                          7.2    15.10 )-----------( 160      ( 2020 06:12 )             21.2     7.2/21.2    Anemia 2'2 acute blood loss at time of . Not requiring blood transfusion.    Plan:  - Ferrous Sulfate, Colace, Vitamin C supplementation.  - Monitor for signs/symptoms of anemia.     Liza Mireles PA-C

## 2020-07-01 VITALS
SYSTOLIC BLOOD PRESSURE: 115 MMHG | HEART RATE: 81 BPM | RESPIRATION RATE: 18 BRPM | DIASTOLIC BLOOD PRESSURE: 73 MMHG | TEMPERATURE: 98 F | OXYGEN SATURATION: 98 %

## 2020-07-01 LAB
HCT VFR BLD CALC: 22.2 % — LOW (ref 34.5–45)
HGB BLD-MCNC: 7.5 G/DL — LOW (ref 11.5–15.5)
MCHC RBC-ENTMCNC: 30.1 PG — SIGNIFICANT CHANGE UP (ref 27–34)
MCHC RBC-ENTMCNC: 33.8 GM/DL — SIGNIFICANT CHANGE UP (ref 32–36)
MCV RBC AUTO: 89.2 FL — SIGNIFICANT CHANGE UP (ref 80–100)
NRBC # BLD: 0 /100 WBCS — SIGNIFICANT CHANGE UP (ref 0–0)
PLATELET # BLD AUTO: 205 K/UL — SIGNIFICANT CHANGE UP (ref 150–400)
RBC # BLD: 2.49 M/UL — LOW (ref 3.8–5.2)
RBC # FLD: 14.6 % — HIGH (ref 10.3–14.5)
WBC # BLD: 11.9 K/UL — HIGH (ref 3.8–10.5)
WBC # FLD AUTO: 11.9 K/UL — HIGH (ref 3.8–10.5)

## 2020-07-01 PROCEDURE — 88307 TISSUE EXAM BY PATHOLOGIST: CPT

## 2020-07-01 PROCEDURE — G0463: CPT

## 2020-07-01 PROCEDURE — 86923 COMPATIBILITY TEST ELECTRIC: CPT

## 2020-07-01 PROCEDURE — 59050 FETAL MONITOR W/REPORT: CPT

## 2020-07-01 PROCEDURE — 86780 TREPONEMA PALLIDUM: CPT

## 2020-07-01 PROCEDURE — 59025 FETAL NON-STRESS TEST: CPT

## 2020-07-01 PROCEDURE — 85027 COMPLETE CBC AUTOMATED: CPT

## 2020-07-01 PROCEDURE — 86900 BLOOD TYPING SEROLOGIC ABO: CPT

## 2020-07-01 PROCEDURE — 86850 RBC ANTIBODY SCREEN: CPT

## 2020-07-01 PROCEDURE — P9016: CPT

## 2020-07-01 PROCEDURE — 86901 BLOOD TYPING SEROLOGIC RH(D): CPT

## 2020-07-01 PROCEDURE — 86769 SARS-COV-2 COVID-19 ANTIBODY: CPT

## 2020-07-01 RX ADMIN — Medication 600 MILLIGRAM(S): at 12:10

## 2020-07-01 RX ADMIN — Medication 975 MILLIGRAM(S): at 03:53

## 2020-07-01 RX ADMIN — Medication 600 MILLIGRAM(S): at 00:12

## 2020-07-01 RX ADMIN — HEPARIN SODIUM 5000 UNIT(S): 5000 INJECTION INTRAVENOUS; SUBCUTANEOUS at 06:02

## 2020-07-01 RX ADMIN — Medication 1 TABLET(S): at 12:10

## 2020-07-01 RX ADMIN — Medication 975 MILLIGRAM(S): at 10:42

## 2020-07-01 RX ADMIN — Medication 325 MILLIGRAM(S): at 12:10

## 2020-07-01 RX ADMIN — Medication 500 MILLIGRAM(S): at 12:10

## 2020-07-01 RX ADMIN — Medication 600 MILLIGRAM(S): at 06:04

## 2020-07-01 NOTE — PROGRESS NOTE ADULT - ATTENDING COMMENTS
Patient examined at bedside        Asymptomatic anemia- discussed signs and symptoms to watch for. Pain well managed. Likely discharge in am.             7.2    15.10 )-----------( 160      ( 30 Jun 2020 06:12 )             21.2
Pt seen and examined.  agree with note above.  Pt reports no lightheadedness, no dizziness, no sob, no cp.  ambulating to the bathroom without difficulty  pt doing well  routine PP care.    had long discussion with patient and husb on increasing iron intake through supplements and diet. Both expressed understanding. Both understood when to call md. Will set up telehealth visit on friday

## 2020-07-01 NOTE — PROGRESS NOTE ADULT - SUBJECTIVE AND OBJECTIVE BOX
Postpartum Note-  Section POD#2      Rubella IgG:    Immune                  RPR:              Negative         Blood Type:    A+    S:Patient is a  35y G 2   P 2   POD#2 S/P C/Sec  Subjective: Patient w/o complaints, pain is controlled.  Pt is OOB, tolerating PO, passing flatus, and voiding. Lochia WNL.   Feeding: Breastfeeding    O:  Vital Signs Last 24 Hrs  T(C): 36.6 (2020 05:28), Max: 37 (2020 08:30)  T(F): 97.9 (2020 05:28), Max: 98.6 (2020 08:30)  HR: 75 (2020 05:28) (64 - 75)  BP: 107/70 (2020 05:28) (91/57 - 114/73)  BP(mean): --  RR: 18 (2020 05:28) (17 - 18)  SpO2: 98% (2020 05:28) (97% - 98%)      Gen: NAD  CV: rrr s1s2, CTABL  Abdomen: Soft, nontender, non distended, fundus firm.  Bowel Sounds x 4 quadrants  Incision: Clean, dry, and intact.  Negative erythema/edema/ecchymosis.   SubQ  Lochia WNL  Ext: Neg edema, Neg calf tenderness.  Pedal pulses palpated B/L    LABS:                          7.2    15.10 )-----------( 160      ( 2020 06:12 )             21.2       A/P:  35y  POD # 2 S/P  primary  section for failure to descend, acute anemia noted EBL: 1000 with a H+H 7.2/21.2, patient is assympomatic, doing well    PMHx: uterine polypectomy  Current Issues: acute anemia, tx with iron,vitamin C    Increase OOB  PO Pain Protocol  Continue Regular Diet  Continue Routine Postop/Postpartum Care Postpartum Note-  Section POD#2      Rubella IgG:    Immune                  RPR:              Negative         Blood Type:    A+    S:Patient is a  35y G 2   P 2   POD#2 S/P C/Sec  Subjective: Patient w/o complaints, pain is controlled.  Pt is OOB, tolerating PO, passing flatus, and voiding. Lochia WNL.   Feeding: Breastfeeding    O:  Vital Signs Last 24 Hrs  T(C): 36.6 (2020 05:28), Max: 37 (2020 08:30)  T(F): 97.9 (2020 05:28), Max: 98.6 (2020 08:30)  HR: 75 (2020 05:28) (64 - 75)  BP: 107/70 (2020 05:28) (91/57 - 114/73)  BP(mean): --  RR: 18 (2020 05:28) (17 - 18)  SpO2: 98% (2020 05:28) (97% - 98%)      Gen: NAD  CV: rrr s1s2, CTABL  Abdomen: Soft, nontender, non distended, fundus firm.  Bowel Sounds x 4 quadrants  Incision: Clean, dry, and intact.  Negative erythema/edema/ecchymosis.   SubQ  Lochia WNL  Ext: Neg edema, Neg calf tenderness.  Pedal pulses palpated B/L    LABS:                          7.2    15.10 )-----------( 160      ( 2020 06:12 )             21.2       A/P:  35y  POD # 2 S/P  primary  section for failure to descend, acute anemia noted EBL: 1000 with a H+H 7.2/21.2, patient is assympomatic, doing well    PMHx: uterine polypectomy  Current Issues: acute anemia, tx with iron,vitamin C, will obtain a repeat cbc today    Increase OOB  PO Pain Protocol  Continue Regular Diet  Continue Routine Postop/Postpartum Care

## 2020-07-01 NOTE — PROGRESS NOTE ADULT - ASSESSMENT
35y  POD # 2 S/P  primary  section for failure to descend, acute anemia noted EBL: 1000 with a H+H 7.2/21.2, patient is assympomatic, doing well

## 2020-07-08 LAB — SURGICAL PATHOLOGY STUDY: SIGNIFICANT CHANGE UP

## 2020-07-13 ENCOUNTER — INPATIENT (INPATIENT)
Facility: HOSPITAL | Age: 35
LOS: 3 days | Discharge: HOME | DRG: 776 | End: 2020-07-17
Attending: OBSTETRICS & GYNECOLOGY | Admitting: OBSTETRICS & GYNECOLOGY
Payer: COMMERCIAL

## 2020-07-13 VITALS
RESPIRATION RATE: 20 BRPM | HEART RATE: 86 BPM | WEIGHT: 117.95 LBS | SYSTOLIC BLOOD PRESSURE: 132 MMHG | TEMPERATURE: 98 F | DIASTOLIC BLOOD PRESSURE: 83 MMHG | HEIGHT: 60 IN | OXYGEN SATURATION: 98 %

## 2020-07-13 LAB
ALBUMIN SERPL ELPH-MCNC: 3.7 G/DL — SIGNIFICANT CHANGE UP (ref 3.3–5)
ALP SERPL-CCNC: 83 U/L — SIGNIFICANT CHANGE UP (ref 40–120)
ALT FLD-CCNC: 5 U/L — LOW (ref 10–45)
ANION GAP SERPL CALC-SCNC: 12 MMOL/L — SIGNIFICANT CHANGE UP (ref 5–17)
AST SERPL-CCNC: 12 U/L — SIGNIFICANT CHANGE UP (ref 10–40)
BASE EXCESS BLDV CALC-SCNC: 1 MMOL/L — SIGNIFICANT CHANGE UP (ref -2–2)
BASOPHILS # BLD AUTO: 0.04 K/UL — SIGNIFICANT CHANGE UP (ref 0–0.2)
BASOPHILS NFR BLD AUTO: 0.3 % — SIGNIFICANT CHANGE UP (ref 0–2)
BILIRUB SERPL-MCNC: 0.2 MG/DL — SIGNIFICANT CHANGE UP (ref 0.2–1.2)
BUN SERPL-MCNC: 11 MG/DL — SIGNIFICANT CHANGE UP (ref 7–23)
CA-I SERPL-SCNC: 1.28 MMOL/L — SIGNIFICANT CHANGE UP (ref 1.12–1.3)
CALCIUM SERPL-MCNC: 9.4 MG/DL — SIGNIFICANT CHANGE UP (ref 8.4–10.5)
CHLORIDE BLDV-SCNC: 105 MMOL/L — SIGNIFICANT CHANGE UP (ref 96–108)
CHLORIDE SERPL-SCNC: 104 MMOL/L — SIGNIFICANT CHANGE UP (ref 96–108)
CO2 BLDV-SCNC: 27 MMOL/L — SIGNIFICANT CHANGE UP (ref 22–30)
CO2 SERPL-SCNC: 24 MMOL/L — SIGNIFICANT CHANGE UP (ref 22–31)
CREAT SERPL-MCNC: 0.58 MG/DL — SIGNIFICANT CHANGE UP (ref 0.5–1.3)
EOSINOPHIL # BLD AUTO: 0.2 K/UL — SIGNIFICANT CHANGE UP (ref 0–0.5)
EOSINOPHIL NFR BLD AUTO: 1.6 % — SIGNIFICANT CHANGE UP (ref 0–6)
GAS PNL BLDV: 141 MMOL/L — SIGNIFICANT CHANGE UP (ref 135–145)
GAS PNL BLDV: SIGNIFICANT CHANGE UP
GAS PNL BLDV: SIGNIFICANT CHANGE UP
GLUCOSE BLDV-MCNC: 84 MG/DL — SIGNIFICANT CHANGE UP (ref 70–99)
GLUCOSE SERPL-MCNC: 85 MG/DL — SIGNIFICANT CHANGE UP (ref 70–99)
HCO3 BLDV-SCNC: 26 MMOL/L — SIGNIFICANT CHANGE UP (ref 21–29)
HCT VFR BLD CALC: 27.4 % — LOW (ref 34.5–45)
HCT VFR BLDA CALC: 30 % — LOW (ref 39–50)
HGB BLD CALC-MCNC: 9.8 G/DL — LOW (ref 11.5–15.5)
HGB BLD-MCNC: 8.7 G/DL — LOW (ref 11.5–15.5)
IMM GRANULOCYTES NFR BLD AUTO: 0.8 % — SIGNIFICANT CHANGE UP (ref 0–1.5)
LACTATE BLDV-MCNC: 0.5 MMOL/L — LOW (ref 0.7–2)
LYMPHOCYTES # BLD AUTO: 1.94 K/UL — SIGNIFICANT CHANGE UP (ref 1–3.3)
LYMPHOCYTES # BLD AUTO: 15.4 % — SIGNIFICANT CHANGE UP (ref 13–44)
MCHC RBC-ENTMCNC: 28.2 PG — SIGNIFICANT CHANGE UP (ref 27–34)
MCHC RBC-ENTMCNC: 31.8 GM/DL — LOW (ref 32–36)
MCV RBC AUTO: 89 FL — SIGNIFICANT CHANGE UP (ref 80–100)
MONOCYTES # BLD AUTO: 0.81 K/UL — SIGNIFICANT CHANGE UP (ref 0–0.9)
MONOCYTES NFR BLD AUTO: 6.4 % — SIGNIFICANT CHANGE UP (ref 2–14)
NEUTROPHILS # BLD AUTO: 9.53 K/UL — HIGH (ref 1.8–7.4)
NEUTROPHILS NFR BLD AUTO: 75.5 % — SIGNIFICANT CHANGE UP (ref 43–77)
NRBC # BLD: 0 /100 WBCS — SIGNIFICANT CHANGE UP (ref 0–0)
PCO2 BLDV: 45 MMHG — SIGNIFICANT CHANGE UP (ref 35–50)
PH BLDV: 7.38 — SIGNIFICANT CHANGE UP (ref 7.35–7.45)
PLATELET # BLD AUTO: 497 K/UL — HIGH (ref 150–400)
PO2 BLDV: 26 MMHG — SIGNIFICANT CHANGE UP (ref 25–45)
POTASSIUM BLDV-SCNC: 3.8 MMOL/L — SIGNIFICANT CHANGE UP (ref 3.5–5.3)
POTASSIUM SERPL-MCNC: 4 MMOL/L — SIGNIFICANT CHANGE UP (ref 3.5–5.3)
POTASSIUM SERPL-SCNC: 4 MMOL/L — SIGNIFICANT CHANGE UP (ref 3.5–5.3)
PROT SERPL-MCNC: 6.7 G/DL — SIGNIFICANT CHANGE UP (ref 6–8.3)
RBC # BLD: 3.08 M/UL — LOW (ref 3.8–5.2)
RBC # FLD: 13.2 % — SIGNIFICANT CHANGE UP (ref 10.3–14.5)
SAO2 % BLDV: 37 % — LOW (ref 67–88)
SARS-COV-2 RNA SPEC QL NAA+PROBE: SIGNIFICANT CHANGE UP
SODIUM SERPL-SCNC: 140 MMOL/L — SIGNIFICANT CHANGE UP (ref 135–145)
WBC # BLD: 12.62 K/UL — HIGH (ref 3.8–10.5)
WBC # FLD AUTO: 12.62 K/UL — HIGH (ref 3.8–10.5)

## 2020-07-13 PROCEDURE — 74177 CT ABD & PELVIS W/CONTRAST: CPT | Mod: 26

## 2020-07-13 PROCEDURE — 99285 EMERGENCY DEPT VISIT HI MDM: CPT

## 2020-07-13 RX ADMIN — Medication 100 MILLIGRAM(S): at 21:24

## 2020-07-13 NOTE — ED PROVIDER NOTE - PROGRESS NOTE DETAILS
Dolan: marcello CLARK Magdaleno: consulted veronica CLARK to start clinda Attending Ana:  received s/o. possible surgical infection. given clinda. spoke w/ ob who would like to admit pt. also would like ertapenem which resident will put in .

## 2020-07-13 NOTE — ED PROVIDER NOTE - CLINICAL SUMMARY MEDICAL DECISION MAKING FREE TEXT BOX
34 y/o F with surgical site infection from . PT is not septic at this time. Will obtain labs, abx, and consult obgyn.

## 2020-07-13 NOTE — ED PROVIDER NOTE - PHYSICAL EXAMINATION
Gen: NAD, AOx3, non-toxic appearing  Head and Neck: NCAT, Neck supple without meningismus   HEENT: EOMI, PEERLA, normal conjunctiva, tongue midline, oral mucosa moist  Lung: CTAB, no respiratory distress, no wheezes/rhonchi/rales B/L, speaking in full sentences  CV: RRR, no murmurs, rubs or gallops  Abd: soft, Tender over  wound, no rigidity, no rebound tenderness, no CVA tenderness, no masses.   MSK: no gross deformities, ROM normal in UE/LE, no back tenderness  Neuro: CNs II-XII grossly intact. No focal sensory or motor deficits  Skin: Healing  horizontal wound area of erythema, brown discharge from right side, circled by a blue pen  Psych: Appropriate mood and affect

## 2020-07-13 NOTE — ED ADULT NURSE NOTE - NSIMPLEMENTINTERV_GEN_ALL_ED
Implemented All Universal Safety Interventions:  [place] to call system. Call bell, personal items and telephone within reach. Instruct patient to call for assistance. Room bathroom lighting operational. Non-slip footwear when patient is off stretcher. Physically safe environment: no spills, clutter or unnecessary equipment. Stretcher in lowest position, wheels locked, appropriate side rails in place.

## 2020-07-13 NOTE — ED PROVIDER NOTE - NSFOLLOWUPINSTRUCTIONS_ED_ALL_ED_FT
Cellulitis    Cellulitis is a skin infection caused by bacteria. This condition occurs most often in the arms and lower legs but can occur anywhere over the body. Symptoms include redness, swelling, warm skin, tenderness, and chills/fever. If you were prescribed an antibiotic medicine, take it as told by your health care provider. Do not stop taking the antibiotic even if you start to feel better.    SEEK IMMEDIATE MEDICAL CARE IF YOU HAVE ANY OF THE FOLLOWING SYMPTOMS: worsening fever, red streaks coming from affected area, vomiting or diarrhea, or dizziness/lightheadedness.    - Follow up with your primary care doctor in 1-2 days.    - Bring results with you to the appointment.   - Take tylenol 650mg or motrin 600mg every 6 hours for pain or fever.   - Return to the ED for new or worsening symptoms.

## 2020-07-13 NOTE — ED PROVIDER NOTE - OBJECTIVE STATEMENT
34 y/o F with hx of migraines s/p C section 2 weeks ago presents with 3 days of swelling, erythema, and discharge over  wound. States she went to see OBGYN who documented her temperature at 99.5 and referred pt to ED for cullulitis tx. Tolerating PO. Pt otherwise denies systemic sx. Denies CP, SOB, cough, vaginal discharge, or n/v.

## 2020-07-14 DIAGNOSIS — T81.49XA INFECTION FOLLOWING A PROCEDURE, OTHER SURGICAL SITE, INITIAL ENCOUNTER: ICD-10-CM

## 2020-07-14 DIAGNOSIS — L03.90 CELLULITIS, UNSPECIFIED: ICD-10-CM

## 2020-07-14 LAB
APPEARANCE UR: CLEAR — SIGNIFICANT CHANGE UP
BASOPHILS # BLD AUTO: 0.06 K/UL — SIGNIFICANT CHANGE UP (ref 0–0.2)
BASOPHILS NFR BLD AUTO: 0.5 % — SIGNIFICANT CHANGE UP (ref 0–2)
BILIRUB UR-MCNC: NEGATIVE — SIGNIFICANT CHANGE UP
COLOR SPEC: SIGNIFICANT CHANGE UP
DIFF PNL FLD: ABNORMAL
EOSINOPHIL # BLD AUTO: 0.28 K/UL — SIGNIFICANT CHANGE UP (ref 0–0.5)
EOSINOPHIL NFR BLD AUTO: 2.6 % — SIGNIFICANT CHANGE UP (ref 0–6)
GLUCOSE UR QL: NEGATIVE — SIGNIFICANT CHANGE UP
HCT VFR BLD CALC: 26.5 % — LOW (ref 34.5–45)
HGB BLD-MCNC: 8.5 G/DL — LOW (ref 11.5–15.5)
IMM GRANULOCYTES NFR BLD AUTO: 0.7 % — SIGNIFICANT CHANGE UP (ref 0–1.5)
KETONES UR-MCNC: NEGATIVE — SIGNIFICANT CHANGE UP
LEUKOCYTE ESTERASE UR-ACNC: ABNORMAL
LYMPHOCYTES # BLD AUTO: 19.9 % — SIGNIFICANT CHANGE UP (ref 13–44)
LYMPHOCYTES # BLD AUTO: 2.17 K/UL — SIGNIFICANT CHANGE UP (ref 1–3.3)
MCHC RBC-ENTMCNC: 28.5 PG — SIGNIFICANT CHANGE UP (ref 27–34)
MCHC RBC-ENTMCNC: 32.1 GM/DL — SIGNIFICANT CHANGE UP (ref 32–36)
MCV RBC AUTO: 88.9 FL — SIGNIFICANT CHANGE UP (ref 80–100)
MONOCYTES # BLD AUTO: 0.85 K/UL — SIGNIFICANT CHANGE UP (ref 0–0.9)
MONOCYTES NFR BLD AUTO: 7.8 % — SIGNIFICANT CHANGE UP (ref 2–14)
NEUTROPHILS # BLD AUTO: 7.49 K/UL — HIGH (ref 1.8–7.4)
NEUTROPHILS NFR BLD AUTO: 68.5 % — SIGNIFICANT CHANGE UP (ref 43–77)
NITRITE UR-MCNC: NEGATIVE — SIGNIFICANT CHANGE UP
NRBC # BLD: 0 /100 WBCS — SIGNIFICANT CHANGE UP (ref 0–0)
PH UR: 6 — SIGNIFICANT CHANGE UP (ref 5–8)
PLATELET # BLD AUTO: 501 K/UL — HIGH (ref 150–400)
PROT UR-MCNC: NEGATIVE — SIGNIFICANT CHANGE UP
RBC # BLD: 2.98 M/UL — LOW (ref 3.8–5.2)
RBC # FLD: 13.2 % — SIGNIFICANT CHANGE UP (ref 10.3–14.5)
SARS-COV-2 IGG SERPL QL IA: NEGATIVE — SIGNIFICANT CHANGE UP
SARS-COV-2 IGM SERPL IA-ACNC: 0.19 INDEX — SIGNIFICANT CHANGE UP
SP GR SPEC: 1.01 — SIGNIFICANT CHANGE UP (ref 1.01–1.02)
UROBILINOGEN FLD QL: NEGATIVE — SIGNIFICANT CHANGE UP
WBC # BLD: 10.93 K/UL — HIGH (ref 3.8–10.5)
WBC # FLD AUTO: 10.93 K/UL — HIGH (ref 3.8–10.5)

## 2020-07-14 RX ORDER — ACETAMINOPHEN 500 MG
975 TABLET ORAL
Refills: 0 | Status: DISCONTINUED | OUTPATIENT
Start: 2020-07-14 | End: 2020-07-17

## 2020-07-14 RX ORDER — HEPARIN SODIUM 5000 [USP'U]/ML
5000 INJECTION INTRAVENOUS; SUBCUTANEOUS EVERY 12 HOURS
Refills: 0 | Status: DISCONTINUED | OUTPATIENT
Start: 2020-07-14 | End: 2020-07-17

## 2020-07-14 RX ORDER — SIMETHICONE 80 MG/1
80 TABLET, CHEWABLE ORAL EVERY 4 HOURS
Refills: 0 | Status: DISCONTINUED | OUTPATIENT
Start: 2020-07-14 | End: 2020-07-17

## 2020-07-14 RX ORDER — MAGNESIUM HYDROXIDE 400 MG/1
30 TABLET, CHEWABLE ORAL
Refills: 0 | Status: DISCONTINUED | OUTPATIENT
Start: 2020-07-14 | End: 2020-07-17

## 2020-07-14 RX ORDER — TETANUS TOXOID, REDUCED DIPHTHERIA TOXOID AND ACELLULAR PERTUSSIS VACCINE, ADSORBED 5; 2.5; 8; 8; 2.5 [IU]/.5ML; [IU]/.5ML; UG/.5ML; UG/.5ML; UG/.5ML
0.5 SUSPENSION INTRAMUSCULAR ONCE
Refills: 0 | Status: DISCONTINUED | OUTPATIENT
Start: 2020-07-14 | End: 2020-07-17

## 2020-07-14 RX ORDER — DIPHENHYDRAMINE HCL 50 MG
25 CAPSULE ORAL EVERY 6 HOURS
Refills: 0 | Status: DISCONTINUED | OUTPATIENT
Start: 2020-07-14 | End: 2020-07-17

## 2020-07-14 RX ORDER — ERTAPENEM SODIUM 1 G/1
1000 INJECTION, POWDER, LYOPHILIZED, FOR SOLUTION INTRAMUSCULAR; INTRAVENOUS ONCE
Refills: 0 | Status: COMPLETED | OUTPATIENT
Start: 2020-07-14 | End: 2020-07-14

## 2020-07-14 RX ORDER — OXYCODONE HYDROCHLORIDE 5 MG/1
5 TABLET ORAL
Refills: 0 | Status: DISCONTINUED | OUTPATIENT
Start: 2020-07-14 | End: 2020-07-17

## 2020-07-14 RX ORDER — OXYCODONE HYDROCHLORIDE 5 MG/1
5 TABLET ORAL ONCE
Refills: 0 | Status: DISCONTINUED | OUTPATIENT
Start: 2020-07-14 | End: 2020-07-17

## 2020-07-14 RX ORDER — IBUPROFEN 200 MG
600 TABLET ORAL EVERY 6 HOURS
Refills: 0 | Status: DISCONTINUED | OUTPATIENT
Start: 2020-07-14 | End: 2020-07-17

## 2020-07-14 RX ORDER — OXYTOCIN 10 UNIT/ML
333.33 VIAL (ML) INJECTION
Qty: 20 | Refills: 0 | Status: DISCONTINUED | OUTPATIENT
Start: 2020-07-14 | End: 2020-07-17

## 2020-07-14 RX ORDER — ASCORBIC ACID 60 MG
500 TABLET,CHEWABLE ORAL DAILY
Refills: 0 | Status: DISCONTINUED | OUTPATIENT
Start: 2020-07-14 | End: 2020-07-17

## 2020-07-14 RX ORDER — LANOLIN
1 OINTMENT (GRAM) TOPICAL EVERY 6 HOURS
Refills: 0 | Status: DISCONTINUED | OUTPATIENT
Start: 2020-07-14 | End: 2020-07-17

## 2020-07-14 RX ADMIN — Medication 975 MILLIGRAM(S): at 17:25

## 2020-07-14 RX ADMIN — ERTAPENEM SODIUM 120 MILLIGRAM(S): 1 INJECTION, POWDER, LYOPHILIZED, FOR SOLUTION INTRAMUSCULAR; INTRAVENOUS at 03:05

## 2020-07-14 RX ADMIN — Medication 975 MILLIGRAM(S): at 11:41

## 2020-07-14 RX ADMIN — HEPARIN SODIUM 5000 UNIT(S): 5000 INJECTION INTRAVENOUS; SUBCUTANEOUS at 05:03

## 2020-07-14 RX ADMIN — Medication 500 MILLIGRAM(S): at 15:26

## 2020-07-14 RX ADMIN — Medication 975 MILLIGRAM(S): at 10:47

## 2020-07-14 RX ADMIN — Medication 975 MILLIGRAM(S): at 15:22

## 2020-07-14 RX ADMIN — HEPARIN SODIUM 5000 UNIT(S): 5000 INJECTION INTRAVENOUS; SUBCUTANEOUS at 17:59

## 2020-07-14 RX ADMIN — Medication 1 TABLET(S): at 15:22

## 2020-07-14 NOTE — H&P ADULT - NSHPLABSRESULTS_GEN_ALL_CORE
LABS:                        8.7    12.62 )-----------( 497      ( 13 Jul 2020 20:14 )             27.4     07-13    140  |  104  |  11  ----------------------------<  85  4.0   |  24  |  0.58    Ca    9.4      13 Jul 2020 20:14    TPro  6.7  /  Alb  3.7  /  TBili  0.2  /  DBili  x   /  AST  12  /  ALT  5<L>  /  AlkPhos  83  07-13          RADIOLOGY & ADDITIONAL STUDIES:  < from: CT Abdomen and Pelvis w/ IV Cont (07.13.20 @ 23:25) >  FINDINGS:    LOWER CHEST: Within normal limits.    LIVER: Again noted, enhancing lesion in the right lobe, characterize as a hemangioma on the previous MRI.  BILE DUCTS: Normal caliber.  GALLBLADDER: No significant gallbladder wall edema.  SPLEEN: Within normal limits.  PANCREAS: Within normal limits.    ADRENALS: Within normal limits.  KIDNEYS/URETERS: No hydronephrosis, hydroureter or significant perinephric stranding.  BLADDER: Partially distended. Diffusely prominent wall.  REPRODUCTIVE ORGANS: Retroflexed uterus demonstrating nonspecific heterogeneous enhancement and post surgical changes. Prominent endometrium (0.9 cm). No adnexal mass.    BOWEL: No bowel obstruction. Unremarkable appendix. Prominent wall of the distal stomach.  PERITONEUM: No freeair. Trace pelvic free fluid.  VESSELS: Within normal limits.  RETROPERITONEUM/LYMPH NODES: No lymphadenopathy.    ABDOMINAL WALL: Nonspecific stranding along the anterior pelvic wall reflecting postsurgical changes. Small rim-enhancing fluid collections along the anterior pelvic wall, for example, 1.7 x 1.0 x 2.2 cm on the right (3:79) and 4.4 x 0.8 x 2.4 cm on the left) (3:76).   BONES: Degenerative changes of the spine.    IMPRESSION:     Nonspecific stranding along the anterior pelvic wall reflecting postsurgical changes. Small fluid collections along the anterior pelvic wall as described, which may represent a hematoma. Recommend clinical correlation to assess underlying infection (cellulitis/abscess).     Nonspecific heterogeneous enhancement of the uterus. Prominent endometrium. Recommend clinical correlation to assess myometritis/endometritis.    Diffusely prominent bladder wall, which may be due to partial distention. Recommend clinical correlation to assess urinary tract infection.    Prominent wall of the distal stomach, which may be due to partial distention and/or gastritis. Recommend clinical correlation. If clinically indicated, follow-up endoscopy may be obtained for further evaluation.    Additional findings as described.                ZHANG JAY M.D., RADIOLOGY RESIDENT  This document has been electronically signed.  GAIL FOWLER M.D., ATTENDING RADIOLOGIST  This document has been electronically signed. Jul 14 2020  2:11AM    < end of copied text >

## 2020-07-14 NOTE — H&P ADULT - PROBLEM SELECTOR PLAN 1
- admit to antepartum service  - received 1 dose of Clindamycin IV in ED. To receive Invanz IV q24h.   - PP pain orders placed  - Continue monitoring vital signs  - Continue monitoring erythema around incision for improvement/worsening vs stability  - AM CBC    Patient d/w Dr. Shaikh and Dr. Yap PGY-4  [place] PGY-2

## 2020-07-14 NOTE — H&P ADULT - ATTENDING COMMENTS
I personally saw and evaluated pt and agree with Dr Goode's assessment and plan.  Pt states she started noticing erythema around her c/s incision over the weekend which worsened with some serosanguinous drainage.  She was then seen in office and was sent to ER for evaluation for wound infection.  On exam, cellulitis noted along incision with some erythema.  Area was marked with pen and erythema has NOT extended beyond the border.  Per pt erythema has improved.  She notes minimal drainage from incision  Fluctuance noted b/l lateral aspects of incision and is tender to touch.  On CT stranding collection noted at level of anterior pelvic wall with larger collection ~4cm on L lateral aspect.  No skin separation noted and no active drainage noted  Pt was started on Invanz earlier this am.  She has remained afebrile since admission with mild leukocytosis  Reassured pt invanz is safe to use while nursing.   Will continue close monitoring and trend temperature curve and wbc count  Will culture drainage

## 2020-07-14 NOTE — H&P ADULT - ASSESSMENT
35y  POD#14 from [facility] () presents with incisional erythema and drainage found at OB office, found to have small rimmed fluid collections along anterior pelvic wall on CT scan, with stable vital signs and mildly elevated white count.

## 2020-07-14 NOTE — H&P ADULT - NSHPREVIEWOFSYSTEMS_GEN_ALL_CORE
REVIEW OF SYSTEMS  General: denies fevers, chills, tiredness  Skin/Breast: denies breast pain  Respiratory and Thorax: denies shortness of breath, denies cough  Cardiovascular: denies chest pain and denies palpitations  Gastrointestinal: denies abdominal pain, nausea/ vomiting	  Genitourinary: denies dysuria, increased urinary frequency, urgency	  Constitutional, Cardiovascular, Respiratory, Gastrointestinal, Genitourinary, Musculoskeletal and Integumentary review of systems are normal except as noted.

## 2020-07-14 NOTE — H&P ADULT - HISTORY OF PRESENT ILLNESS
GYN Consult Note    35y  POD#14 from [facility] () presents with incisional erythema and drainage found at OB office, sent directly to ED. Pt states incision was healing well until Saturday morning when she began to notice some erythema. Had an appointment Monday was OB, so used ice and tylenol/motrin. No discharge noted at that time.  evening began expressing serous drainage, nonmalodorous. Saw Dr. Stearns today in office, area surround incision noted to be erythematous with mild tenderness immediately surround incision - marked around area with pen. Pt in ED denies abdominal pain, fever, chills. Denies n/v. Reports more purulent discharge starting later in evening Monday.       OB/GYN HISTORY:   SAB x 1  c/s x 1       Name of GYN Physician: Dr. Stearns     PAST MEDICAL & SURGICAL HISTORY:  Uterine polyp  No significant past surgical history      	    MEDICATIONS  (STANDING):  acetaminophen   Tablet .. 975 milliGRAM(s) Oral <User Schedule>  diphtheria/tetanus/pertussis (acellular) Vaccine (ADAcel) 0.5 milliLiter(s) IntraMuscular once  ertapenem  IVPB 1000 milliGRAM(s) IV Intermittent every 24 hours  heparin   Injectable 5000 Unit(s) SubCutaneous every 12 hours  ibuprofen  Tablet. 600 milliGRAM(s) Oral every 6 hours  oxytocin Infusion 333.333 milliUNIT(s)/Min (1000 mL/Hr) IV Continuous <Continuous>    MEDICATIONS  (PRN):  diphenhydrAMINE 25 milliGRAM(s) Oral every 6 hours PRN Itching  lanolin Ointment 1 Application(s) Topical every 6 hours PRN Sore Nipples  magnesium hydroxide Suspension 30 milliLiter(s) Oral two times a day PRN Constipation  oxyCODONE    IR 5 milliGRAM(s) Oral every 3 hours PRN Moderate to Severe Pain (4-10)  oxyCODONE    IR 5 milliGRAM(s) Oral once PRN Moderate to Severe Pain (4-10)  simethicone 80 milliGRAM(s) Chew every 4 hours PRN Gas      Allergies    No Known Allergies    Intolerances        SOCIAL HISTORY: lives at home with baby and .

## 2020-07-15 DIAGNOSIS — L03.90 CELLULITIS, UNSPECIFIED: ICD-10-CM

## 2020-07-15 LAB
ANION GAP SERPL CALC-SCNC: 12 MMOL/L — SIGNIFICANT CHANGE UP (ref 5–17)
BASOPHILS # BLD AUTO: 0.05 K/UL — SIGNIFICANT CHANGE UP (ref 0–0.2)
BASOPHILS NFR BLD AUTO: 0.6 % — SIGNIFICANT CHANGE UP (ref 0–2)
BLD GP AB SCN SERPL QL: NEGATIVE — SIGNIFICANT CHANGE UP
BUN SERPL-MCNC: 14 MG/DL — SIGNIFICANT CHANGE UP (ref 7–23)
CALCIUM SERPL-MCNC: 9.7 MG/DL — SIGNIFICANT CHANGE UP (ref 8.4–10.5)
CHLORIDE SERPL-SCNC: 105 MMOL/L — SIGNIFICANT CHANGE UP (ref 96–108)
CO2 SERPL-SCNC: 24 MMOL/L — SIGNIFICANT CHANGE UP (ref 22–31)
CREAT SERPL-MCNC: 0.57 MG/DL — SIGNIFICANT CHANGE UP (ref 0.5–1.3)
CULTURE RESULTS: SIGNIFICANT CHANGE UP
EOSINOPHIL # BLD AUTO: 0.31 K/UL — SIGNIFICANT CHANGE UP (ref 0–0.5)
EOSINOPHIL NFR BLD AUTO: 3.6 % — SIGNIFICANT CHANGE UP (ref 0–6)
GLUCOSE SERPL-MCNC: 74 MG/DL — SIGNIFICANT CHANGE UP (ref 70–99)
HCT VFR BLD CALC: 28.7 % — LOW (ref 34.5–45)
HGB BLD-MCNC: 9.1 G/DL — LOW (ref 11.5–15.5)
IMM GRANULOCYTES NFR BLD AUTO: 0.9 % — SIGNIFICANT CHANGE UP (ref 0–1.5)
LYMPHOCYTES # BLD AUTO: 2.07 K/UL — SIGNIFICANT CHANGE UP (ref 1–3.3)
LYMPHOCYTES # BLD AUTO: 24 % — SIGNIFICANT CHANGE UP (ref 13–44)
MCHC RBC-ENTMCNC: 28.3 PG — SIGNIFICANT CHANGE UP (ref 27–34)
MCHC RBC-ENTMCNC: 31.7 GM/DL — LOW (ref 32–36)
MCV RBC AUTO: 89.1 FL — SIGNIFICANT CHANGE UP (ref 80–100)
MONOCYTES # BLD AUTO: 0.71 K/UL — SIGNIFICANT CHANGE UP (ref 0–0.9)
MONOCYTES NFR BLD AUTO: 8.2 % — SIGNIFICANT CHANGE UP (ref 2–14)
NEUTROPHILS # BLD AUTO: 5.4 K/UL — SIGNIFICANT CHANGE UP (ref 1.8–7.4)
NEUTROPHILS NFR BLD AUTO: 62.7 % — SIGNIFICANT CHANGE UP (ref 43–77)
NRBC # BLD: 0 /100 WBCS — SIGNIFICANT CHANGE UP (ref 0–0)
PLATELET # BLD AUTO: 582 K/UL — HIGH (ref 150–400)
POTASSIUM SERPL-MCNC: 4.1 MMOL/L — SIGNIFICANT CHANGE UP (ref 3.5–5.3)
POTASSIUM SERPL-SCNC: 4.1 MMOL/L — SIGNIFICANT CHANGE UP (ref 3.5–5.3)
RBC # BLD: 3.22 M/UL — LOW (ref 3.8–5.2)
RBC # FLD: 13.2 % — SIGNIFICANT CHANGE UP (ref 10.3–14.5)
RH IG SCN BLD-IMP: POSITIVE — SIGNIFICANT CHANGE UP
SODIUM SERPL-SCNC: 141 MMOL/L — SIGNIFICANT CHANGE UP (ref 135–145)
SPECIMEN SOURCE: SIGNIFICANT CHANGE UP
WBC # BLD: 8.62 K/UL — SIGNIFICANT CHANGE UP (ref 3.8–10.5)
WBC # FLD AUTO: 8.62 K/UL — SIGNIFICANT CHANGE UP (ref 3.8–10.5)

## 2020-07-15 RX ORDER — LORATADINE 10 MG/1
10 TABLET ORAL DAILY
Refills: 0 | Status: DISCONTINUED | OUTPATIENT
Start: 2020-07-15 | End: 2020-07-17

## 2020-07-15 RX ORDER — SENNA PLUS 8.6 MG/1
2 TABLET ORAL AT BEDTIME
Refills: 0 | Status: DISCONTINUED | OUTPATIENT
Start: 2020-07-15 | End: 2020-07-17

## 2020-07-15 RX ADMIN — Medication 1 TABLET(S): at 12:17

## 2020-07-15 RX ADMIN — LORATADINE 10 MILLIGRAM(S): 10 TABLET ORAL at 16:50

## 2020-07-15 RX ADMIN — Medication 500 MILLIGRAM(S): at 12:17

## 2020-07-15 RX ADMIN — SENNA PLUS 2 TABLET(S): 8.6 TABLET ORAL at 21:54

## 2020-07-15 RX ADMIN — HEPARIN SODIUM 5000 UNIT(S): 5000 INJECTION INTRAVENOUS; SUBCUTANEOUS at 05:53

## 2020-07-15 RX ADMIN — HEPARIN SODIUM 5000 UNIT(S): 5000 INJECTION INTRAVENOUS; SUBCUTANEOUS at 18:13

## 2020-07-15 NOTE — PROGRESS NOTE ADULT - SUBJECTIVE AND OBJECTIVE BOX
ob/gyn attg note  7/15/20  Pt feels better today.  reports decreased pain, redness at her cs wound.  VSS, no fever.   abd is soft, NT, NT.  uterus is well toned and non tender.  cs wound: mild erythema and induration, superficial cellulitis, w/ a margin of ~ 1-2cm around the incision.  skin edges are intact.  there's no discharge or fluid or blood from the wound.  WBC today: decreased to 8K.  normal.  a/p: post cs wound infection / cellulitis.  is clinically improving.  would continue IV abx till tomorrow. If continues to improve, would consider switch to PO abx tomorrow.  pt counseled and all qs answered.        Electronic Signatures:  Riccardo Bridges)  (Signed 15-Jul-2020 16:03)  	Authored: Progress Note, Reason for Admission, Subjective and Objective      Last Updated: 15-Jul-2020 16:03 by Riccardo Bridges)

## 2020-07-15 NOTE — PROGRESS NOTE ADULT - SUBJECTIVE AND OBJECTIVE BOX
OB Postpartum Note:  Delivery, POD#16     S: 34yo POD#16 s/p [facility] re-admitted with cellulitis at incision. Patient reports improving erythema and pain around incision. She denies fevers, chills, abdominal pain, n/v. She is tolerating a regular diet. Voiding spontaneously. Denies CP/SOB/lightheadedness/dizziness.     O:   Vitals:  Vital Signs Last 24 Hrs  T(C): 36.7 (15 Jul 2020 05:03), Max: 37.1 (2020 10:18)  T(F): 98 (15 Jul 2020 05:03), Max: 98.7 (2020 10:18)  HR: 70 (15 Jul 2020 05:03) (70 - 85)  BP: 106/70 (15 Jul 2020 05:03) (103/68 - 116/77)  BP(mean): --  RR: 18 (15 Jul 2020 05:03) (16 - 18)  SpO2: 97% (15 Jul 2020 05:03) (97% - 99%)    MEDICATIONS  (STANDING):  acetaminophen   Tablet .. 975 milliGRAM(s) Oral <User Schedule>  ascorbic acid 500 milliGRAM(s) Oral daily  diphtheria/tetanus/pertussis (acellular) Vaccine (ADAcel) 0.5 milliLiter(s) IntraMuscular once  ertapenem  IVPB 1000 milliGRAM(s) IV Intermittent every 24 hours  ferrous    sulfate 325 milliGRAM(s) Oral daily  heparin   Injectable 5000 Unit(s) SubCutaneous every 12 hours  ibuprofen  Tablet. 600 milliGRAM(s) Oral every 6 hours  oxytocin Infusion 333.333 milliUNIT(s)/Min (1000 mL/Hr) IV Continuous <Continuous>  prenatal multivitamin 1 Tablet(s) Oral daily    MEDICATIONS  (PRN):  diphenhydrAMINE 25 milliGRAM(s) Oral every 6 hours PRN Itching  lanolin Ointment 1 Application(s) Topical every 6 hours PRN Sore Nipples  magnesium hydroxide Suspension 30 milliLiter(s) Oral two times a day PRN Constipation  oxyCODONE    IR 5 milliGRAM(s) Oral every 3 hours PRN Moderate to Severe Pain (4-10)  oxyCODONE    IR 5 milliGRAM(s) Oral once PRN Moderate to Severe Pain (4-10)  simethicone 80 milliGRAM(s) Chew every 4 hours PRN Gas      Labs:  Blood type: A Positive  Rubella IgG: RPR: Negative                          9.1<L>   8.62 >-----------< 582<H>    ( 07-15 @ 07:14 )             28.7<L>                        8.5<L>   10.93<H> >-----------< 501<H>    (  @ 06:28 )             26.5<L>                        8.7<L>   12.62<H> >-----------< 497<H>    (  @ 20:14 )             27.4<L>    07-15-20 @ 07:12      141  |  105  |  14  ----------------------------<  74  4.1   |  24  |  0.57    20 @ 20:14      140  |  104  |  11  ----------------------------<  85  4.0   |  24  |  0.58        Ca    9.7      15 Jul 2020 07:12  Ca    9.4      2020 20:14    TPro  6.7  /  Alb  3.7  /  TBili  0.2  /  DBili  x   /  AST  12  /  ALT  5<L>  /  AlkPhos  83  20 @ 20:14      PE:  General: NAD  Abdomen: soft, NT, ND, Pfannenstiel incision nontender with erythema and induration with no noted expansion beyond marked borders   Extremities: SCDs in place, no erythema

## 2020-07-16 LAB
-  AMPICILLIN/SULBACTAM: SIGNIFICANT CHANGE UP
-  CEFAZOLIN: SIGNIFICANT CHANGE UP
-  CLINDAMYCIN: SIGNIFICANT CHANGE UP
-  ERYTHROMYCIN: SIGNIFICANT CHANGE UP
-  GENTAMICIN: SIGNIFICANT CHANGE UP
-  OXACILLIN: SIGNIFICANT CHANGE UP
-  RIFAMPIN: SIGNIFICANT CHANGE UP
-  TETRACYCLINE: SIGNIFICANT CHANGE UP
-  TRIMETHOPRIM/SULFAMETHOXAZOLE: SIGNIFICANT CHANGE UP
-  VANCOMYCIN: SIGNIFICANT CHANGE UP
ANION GAP SERPL CALC-SCNC: 11 MMOL/L — SIGNIFICANT CHANGE UP (ref 5–17)
BASOPHILS # BLD AUTO: 0.06 K/UL — SIGNIFICANT CHANGE UP (ref 0–0.2)
BASOPHILS NFR BLD AUTO: 0.7 % — SIGNIFICANT CHANGE UP (ref 0–2)
BUN SERPL-MCNC: 10 MG/DL — SIGNIFICANT CHANGE UP (ref 7–23)
CALCIUM SERPL-MCNC: 9.5 MG/DL — SIGNIFICANT CHANGE UP (ref 8.4–10.5)
CHLORIDE SERPL-SCNC: 105 MMOL/L — SIGNIFICANT CHANGE UP (ref 96–108)
CO2 SERPL-SCNC: 24 MMOL/L — SIGNIFICANT CHANGE UP (ref 22–31)
CREAT SERPL-MCNC: 0.58 MG/DL — SIGNIFICANT CHANGE UP (ref 0.5–1.3)
EOSINOPHIL # BLD AUTO: 0.37 K/UL — SIGNIFICANT CHANGE UP (ref 0–0.5)
EOSINOPHIL NFR BLD AUTO: 4.2 % — SIGNIFICANT CHANGE UP (ref 0–6)
GLUCOSE SERPL-MCNC: 69 MG/DL — LOW (ref 70–99)
HCT VFR BLD CALC: 28.7 % — LOW (ref 34.5–45)
HGB BLD-MCNC: 9.3 G/DL — LOW (ref 11.5–15.5)
IMM GRANULOCYTES NFR BLD AUTO: 0.9 % — SIGNIFICANT CHANGE UP (ref 0–1.5)
LYMPHOCYTES # BLD AUTO: 2.58 K/UL — SIGNIFICANT CHANGE UP (ref 1–3.3)
LYMPHOCYTES # BLD AUTO: 29.3 % — SIGNIFICANT CHANGE UP (ref 13–44)
MCHC RBC-ENTMCNC: 28.8 PG — SIGNIFICANT CHANGE UP (ref 27–34)
MCHC RBC-ENTMCNC: 32.4 GM/DL — SIGNIFICANT CHANGE UP (ref 32–36)
MCV RBC AUTO: 88.9 FL — SIGNIFICANT CHANGE UP (ref 80–100)
METHOD TYPE: SIGNIFICANT CHANGE UP
MONOCYTES # BLD AUTO: 0.72 K/UL — SIGNIFICANT CHANGE UP (ref 0–0.9)
MONOCYTES NFR BLD AUTO: 8.2 % — SIGNIFICANT CHANGE UP (ref 2–14)
NEUTROPHILS # BLD AUTO: 5 K/UL — SIGNIFICANT CHANGE UP (ref 1.8–7.4)
NEUTROPHILS NFR BLD AUTO: 56.7 % — SIGNIFICANT CHANGE UP (ref 43–77)
NRBC # BLD: 0 /100 WBCS — SIGNIFICANT CHANGE UP (ref 0–0)
PLATELET # BLD AUTO: 508 K/UL — HIGH (ref 150–400)
POTASSIUM SERPL-MCNC: 4.3 MMOL/L — SIGNIFICANT CHANGE UP (ref 3.5–5.3)
POTASSIUM SERPL-SCNC: 4.3 MMOL/L — SIGNIFICANT CHANGE UP (ref 3.5–5.3)
RBC # BLD: 3.23 M/UL — LOW (ref 3.8–5.2)
RBC # FLD: 13.1 % — SIGNIFICANT CHANGE UP (ref 10.3–14.5)
SODIUM SERPL-SCNC: 140 MMOL/L — SIGNIFICANT CHANGE UP (ref 135–145)
WBC # BLD: 8.81 K/UL — SIGNIFICANT CHANGE UP (ref 3.8–10.5)
WBC # FLD AUTO: 8.81 K/UL — SIGNIFICANT CHANGE UP (ref 3.8–10.5)

## 2020-07-16 PROCEDURE — 99233 SBSQ HOSP IP/OBS HIGH 50: CPT

## 2020-07-16 RX ORDER — CEFAZOLIN SODIUM 1 G
1000 VIAL (EA) INJECTION EVERY 8 HOURS
Refills: 0 | Status: DISCONTINUED | OUTPATIENT
Start: 2020-07-16 | End: 2020-07-17

## 2020-07-16 RX ORDER — CEFAZOLIN SODIUM 1 G
VIAL (EA) INJECTION
Refills: 0 | Status: DISCONTINUED | OUTPATIENT
Start: 2020-07-16 | End: 2020-07-17

## 2020-07-16 RX ORDER — CEFAZOLIN SODIUM 1 G
1000 VIAL (EA) INJECTION ONCE
Refills: 0 | Status: COMPLETED | OUTPATIENT
Start: 2020-07-16 | End: 2020-07-16

## 2020-07-16 RX ORDER — FERROUS SULFATE 325(65) MG
325 TABLET ORAL THREE TIMES A DAY
Refills: 0 | Status: DISCONTINUED | OUTPATIENT
Start: 2020-07-16 | End: 2020-07-17

## 2020-07-16 RX ADMIN — Medication 500 MILLIGRAM(S): at 11:29

## 2020-07-16 RX ADMIN — Medication 100 MILLIGRAM(S): at 20:36

## 2020-07-16 RX ADMIN — HEPARIN SODIUM 5000 UNIT(S): 5000 INJECTION INTRAVENOUS; SUBCUTANEOUS at 18:19

## 2020-07-16 RX ADMIN — LORATADINE 10 MILLIGRAM(S): 10 TABLET ORAL at 11:29

## 2020-07-16 RX ADMIN — SENNA PLUS 2 TABLET(S): 8.6 TABLET ORAL at 22:08

## 2020-07-16 RX ADMIN — Medication 100 MILLIGRAM(S): at 11:30

## 2020-07-16 RX ADMIN — Medication 1 TABLET(S): at 11:29

## 2020-07-16 RX ADMIN — HEPARIN SODIUM 5000 UNIT(S): 5000 INJECTION INTRAVENOUS; SUBCUTANEOUS at 06:47

## 2020-07-16 NOTE — PROGRESS NOTE ADULT - ATTENDING COMMENTS
Patient examined at bedside. Patient feels better and eager to go home. Will await sensitivities and change to IV ancef and consider discharge on keflex once sensitivities back

## 2020-07-16 NOTE — CONSULT NOTE ADULT - SUBJECTIVE AND OBJECTIVE BOX
Amarilis Burrell - [phone]    Patient is a 35y old  Female who presents with a chief complaint of c/s wound infection (15 Jul 2020 16:09)    35F  POD#14 from [facility] () presents with incisional erythema and drainage found at OB office which started .  No discharge initially, then by , wound began expressing serous drainage, nonmalodorous.  Denied abdominal pain, fever, chills. Denied n/v. Reports more purulent discharge starting later in evening Monday ().  Started initially on ertapenem.  CT done with some anterior to pelvis fluid collection.  Culture +SA.  ID asked to help management.     prior hospital charts reviewed [ x ]  primary team notes reviewed [  ]  other consultant notes reviewed [  ]    PAST MEDICAL & SURGICAL HISTORY:  Uterine polyp      Allergies  No Known Allergies    ANTIMICROBIALS:  clindamycin IVPB (7/13 x1)  ertapenem  IVPB (-)  ceFAZolin   IVPB 1000 every 8 hours (-)    MEDICATIONS  (STANDING):  acetaminophen   Tablet .. 975 <User Schedule>  diphtheria/tetanus/pertussis (acellular) Vaccine (ADAcel) 0.5 once  heparin   Injectable 5000 every 12 hours  ibuprofen  Tablet. 600 every 6 hours  loratadine 10 daily  oxytocin Infusion 333.333 <Continuous>  senna 2 at bedtime    SOCIAL HISTORY:  lives with     FAMILY HISTORY:    REVIEW OF SYSTEMS  [  ] ROS unobtainable because:    [  ] All other systems negative except as noted below:	    Constitutional:  [ ] fever [ ] chills  [ ] weight loss  [ ] weakness  Skin:  [ ] rash [ ] phlebitis	  Eyes: [ ] icterus [ ] pain  [ ] discharge	  ENMT: [ ] sore throat  [ ] thrush [ ] ulcers [ ] exudates  Respiratory: [ ] dyspnea [ ] hemoptysis [ ] cough [ ] sputum	  Cardiovascular:  [ ] chest pain [ ] palpitations [ ] edema	  Gastrointestinal:  [ ] nausea [ ] vomiting [ ] diarrhea [ ] constipation [ ] pain	  Genitourinary:  [ ] dysuria [ ] frequency [ ] hematuria [ ] discharge [ ] flank pain  [ ] incontinence  Musculoskeletal:  [ ] myalgias [ ] arthralgias [ ] arthritis  [ ] back pain  Neurological:  [ ] headache [ ] seizures  [ ] confusion/altered mental status  Psychiatric:  [ ] anxiety [ ] depression	  Hematology/Lymphatics:  [ ] lymphadenopathy  Endocrine:  [ ] adrenal [ ] thyroid  Allergic/Immunologic:	 [ ] transplant [ ] seasonal    Vital Signs Last 24 Hrs  T(F): 98.6 (20 @ 05:27), Max: 99.4 (20 @ 17:51)  Vital Signs Last 24 Hrs  HR: 61 (20 @ 05:27) (61 - 78)  BP: 111/69 (20 @ 05:27) (106/73 - 130/80)  RR: 18 (20 @ 05:27)  SpO2: 98% (20 @ 05:27) (98% - 99%)  Wt(kg): --    PHYSICAL EXAM:        WBC Count: 8.81 (20 @ 07:19)  WBC Count: 8.62 (07-15-20 @ 07:14)  WBC Count: 10.93 (20 @ 06:28)  WBC Count: 12.62 (20 @ 20:14)                        9.3    8.81  )-----------( 508      ( 2020 07:19 )             28.7     140  |  105  |  10  ----------------------------<  69<L>  4.3   |  24  |  0.58    Ca    9.5      2020 07:19    MICROBIOLOGY:  Culture - Surgical Swab (collected 15 Jul 2020 00:21)  Source: .Surgical Swab Wound  Preliminary Report (15 Jul 2020 19:05):    Moderate Staphylococcus aureus    Culture - Blood (collected 2020 14:49)  Source: .Blood Blood-Venous  Preliminary Report (15 Jul 2020 15:06):    No growth to date.    Culture - Blood (collected 2020 14:49)  Source: .Blood Blood-Peripheral  Preliminary Report (15 Jul 2020 15:06):    No growth to date.    Culture - Urine (collected 2020 14:42)  Source: .Urine Clean Catch (Midstream)  Final Report (15 Jul 2020 10:59):    <10,000 CFU/mL Normal Urogenital Felicitas    RADIOLOGY:  imaging below personally reviewed and agree with findings    CT Abdomen and Pelvis w/ IV Cont (20 @ 23:25) >  ABDOMINAL WALL: Nonspecific stranding along the anterior pelvic wall reflecting postsurgical changes. Small rim-enhancing fluid collections along the anterior pelvic wall, for example, 1.7 x 1.0 x 2.2 cm on the right (3:79) and 4.4 x 0.8 x 2.4 cm on the left) (3:76).  IMPRESSION:  Nonspecific stranding along the anterior pelvic wall reflecting postsurgical changes. Small fluid collections along the anterior pelvic wall as described, which may represent a hematoma. Recommend clinical correlation to assess underlying infection (cellulitis/abscess). Nonspecific heterogeneous enhancement of the uterus. Prominent endometrium. Recommend clinical correlation to assess myometritis/endometritis.  Diffusely prominent bladder wall, which may be due to partial distention. Recommend clinical correlation to assess urinary tract infection.  Prominent wall of the distal stomach, which may be due to partial distention and/or gastritis. Recommend clinical correlation. If clinically indicated, follow-up endoscopy may be obtained for further evaluation.    Additional findings as described. Amarilis Burrell - [phone]    Patient is a 35y old  Female who presents with a chief complaint of c/s wound infection (15 Jul 2020 16:09)    35F  POD#14 from [facility] () presents with incisional erythema and drainage found at OB office which started .  No discharge initially, then by , wound began expressing serous drainage, nonmalodorous.  Denied abdominal pain, fever, chills. Denied n/v. Reports more purulent discharge starting later in evening Monday ().  Started initially on ertapenem.  CT done with some anterior to pelvis fluid collection.  Culture +SA.  ID asked to help management.    Improved on ertapenem (prior to changing to ancef).  She feels better now.  no fever/chills.  minimal abdominal pain, dysuria. no n/v/d.  patient plans on breast feeding and is pumping.     prior hospital charts reviewed [ x ]  primary team notes reviewed [  ]  other consultant notes reviewed [  ]    PAST MEDICAL & SURGICAL HISTORY:  Uterine polyp      Allergies  No Known Allergies    ANTIMICROBIALS:  clindamycin IVPB (7/13 x1)  ertapenem  IVPB (-)  ceFAZolin   IVPB 1000 every 8 hours (-)    MEDICATIONS  (STANDING):  acetaminophen   Tablet .. 975 <User Schedule>  diphtheria/tetanus/pertussis (acellular) Vaccine (ADAcel) 0.5 once  heparin   Injectable 5000 every 12 hours  ibuprofen  Tablet. 600 every 6 hours  loratadine 10 daily  oxytocin Infusion 333.333 <Continuous>  senna 2 at bedtime    SOCIAL HISTORY:  lives with     FAMILY HISTORY:  non-contributory    REVIEW OF SYSTEMS  [  ] ROS unobtainable because:    [ x ] All other systems negative except as noted below:	    Constitutional:  [ ] fever [ ] chills  [ ] weight loss  [ ] weakness  Skin:  [ ] rash [ ] phlebitis	  Eyes: [ ] icterus [ ] pain  [ ] discharge	  ENMT: [ ] sore throat  [ ] thrush [ ] ulcers [ ] exudates  Respiratory: [ ] dyspnea [ ] hemoptysis [ ] cough [ ] sputum	  Cardiovascular:  [ ] chest pain [ ] palpitations [ ] edema	  Gastrointestinal:  [ ] nausea [ ] vomiting [ ] diarrhea [ ] constipation [ x] pain	  Genitourinary:  [ ] dysuria [ ] frequency [ ] hematuria [ ] discharge [ ] flank pain  [ ] incontinence  Musculoskeletal:  [ ] myalgias [ ] arthralgias [ ] arthritis  [ ] back pain  Neurological:  [ ] headache [ ] seizures  [ ] confusion/altered mental status  Psychiatric:  [ ] anxiety [ ] depression	  Hematology/Lymphatics:  [ ] lymphadenopathy  Endocrine:  [ ] adrenal [ ] thyroid  Allergic/Immunologic:	 [ ] transplant [ ] seasonal    Vital Signs Last 24 Hrs  T(F): 98.6 (20 @ 05:27), Max: 99.4 (20 @ 17:51)  Vital Signs Last 24 Hrs  HR: 61 (20 @ 05:27) (61 - 78)  BP: 111/69 (20 @ 05:27) (106/73 - 130/80)  RR: 18 (20 @ 05:27)  SpO2: 98% (20 @ 05:27) (98% - 99%)  Wt(kg): --    PHYSICAL EXAM:  Constitutional: non-toxic  HEAD/EYES: anicteric  ENT:  supple  Cardiovascular:   normal S1, S2, systolic murmur (per patient, this is not new)  Respiratory:  clear BS bilaterally  GI:  soft, non-tender, minimal erythema of the  incision, no drainage  :  no marrufo  Musculoskeletal:  no synovitis, normal ROM  Neurologic: awake and alert, normal strength, no focal findings  Skin:  no rash, no erythema, no phlebitis  Psychiatric:  awake, alert, appropriate mood    WBC Count: 8.81 (20 @ 07:19)  WBC Count: 8.62 (07-15-20 @ 07:14)  WBC Count: 10.93 (20 @ 06:28)  WBC Count: 12.62 (20 @ 20:14)                        9.3    8.81  )-----------( 508      ( 2020 07:19 )             28.7     140  |  105  |  10  ----------------------------<  69<L>  4.3   |  24  |  0.58    Ca    9.5      2020 07:19    MICROBIOLOGY:  Culture - Surgical Swab (collected 15 Jul 2020 00:21)  Source: .Surgical Swab Wound  Preliminary Report (15 Jul 2020 19:05):    Moderate Staphylococcus aureus    Culture - Blood (collected 2020 14:49)  Source: .Blood Blood-Venous  Preliminary Report (15 Jul 2020 15:06):    No growth to date.    Culture - Blood (collected 2020 14:49)  Source: .Blood Blood-Peripheral  Preliminary Report (15 Jul 2020 15:06):    No growth to date.    Culture - Urine (collected 2020 14:42)  Source: .Urine Clean Catch (Midstream)  Final Report (15 Jul 2020 10:59):    <10,000 CFU/mL Normal Urogenital Felicitas    RADIOLOGY:  imaging below personally reviewed and agree with findings    CT Abdomen and Pelvis w/ IV Cont (20 @ 23:25) >  ABDOMINAL WALL: Nonspecific stranding along the anterior pelvic wall reflecting postsurgical changes. Small rim-enhancing fluid collections along the anterior pelvic wall, for example, 1.7 x 1.0 x 2.2 cm on the right (3:79) and 4.4 x 0.8 x 2.4 cm on the left) (3:76).  IMPRESSION:  Nonspecific stranding along the anterior pelvic wall reflecting postsurgical changes. Small fluid collections along the anterior pelvic wall as described, which may represent a hematoma. Recommend clinical correlation to assess underlying infection (cellulitis/abscess). Nonspecific heterogeneous enhancement of the uterus. Prominent endometrium. Recommend clinical correlation to assess myometritis/endometritis.  Diffusely prominent bladder wall, which may be due to partial distention. Recommend clinical correlation to assess urinary tract infection.  Prominent wall of the distal stomach, which may be due to partial distention and/or gastritis. Recommend clinical correlation. If clinically indicated, follow-up endoscopy may be obtained for further evaluation.    Additional findings as described.

## 2020-07-16 NOTE — PROGRESS NOTE ADULT - SUBJECTIVE AND OBJECTIVE BOX
OB Postpartum Note:  Delivery, POD#17     34yo POD#17 s/p pLTCS re-admitted with cellulitis at incision. Patient reports improving erythema and pain around incision. She denies fevers, chills, abdominal pain, n/v. She is tolerating a regular diet. Voiding spontaneously. Denies CP/SOB/lightheadedness/dizziness.     Vital Signs Last 24 Hrs  T(C): 36.7 (2020 00:42), Max: 37 (15 Jul 2020 09:54)  T(F): 98.1 (2020 00:42), Max: 98.6 (15 Jul 2020 09:54)  HR: 65 (2020 00:42) (65 - 78)  BP: 130/80 (2020 00:42) (106/70 - 130/80)  BP(mean): --  RR: 18 (2020 00:42) (18 - 18)  SpO2: 99% (2020 00:42) (97% - 99%)                            9.1    8.62  )-----------( 582      ( 15 Jul 2020 07:14 )             28.7       07-15    141  |  105  |  14  ----------------------------<  74  4.1   |  24  |  0.57    Ca    9.7      15 Jul 2020 07:12        PE:  General: NAD  Abdomen: soft, NT, ND, Pfannenstiel incision nontender w/erythema and induration. No noted expansion beyond marked borders   Extremities: SCDs in place, no erythema EDUARDO Progress Note      36yo POD#17 s/p pLTCS re-admitted with cellulitis at incision. Patient reports improving erythema and pain around incision. She denies fevers, chills, abdominal pain, n/v. She is tolerating a regular diet. Voiding spontaneously. Denies CP/SOB/lightheadedness/dizziness.     Vital Signs Last 24 Hrs  T(C): 36.7 (16 Jul 2020 00:42), Max: 37 (15 Jul 2020 09:54)  T(F): 98.1 (16 Jul 2020 00:42), Max: 98.6 (15 Jul 2020 09:54)  HR: 65 (16 Jul 2020 00:42) (65 - 78)  BP: 130/80 (16 Jul 2020 00:42) (106/70 - 130/80)  BP(mean): --  RR: 18 (16 Jul 2020 00:42) (18 - 18)  SpO2: 99% (16 Jul 2020 00:42) (97% - 99%)                            9.1    8.62  )-----------( 582      ( 15 Jul 2020 07:14 )             28.7       07-15    141  |  105  |  14  ----------------------------<  74  4.1   |  24  |  0.57    Ca    9.7      15 Jul 2020 07:12        PE:  General: NAD  Abdomen: soft, NT, ND, Pfannenstiel incision nontender w/erythema and induration. No noted expansion beyond marked borders   Extremities: SCDs in place, no erythema EDUARDO Progress Note      36yo POD#17 s/p pLTCS re-admitted with cellulitis at incision. Patient reports improving erythema and pain around incision. She denies fevers, chills, abdominal pain, n/v. She is tolerating a regular diet. Voiding spontaneously. Denies CP/SOB/lightheadedness/dizziness.     Vital Signs Last 24 Hrs  T(C): 36.7 (16 Jul 2020 00:42), Max: 37 (15 Jul 2020 09:54)  T(F): 98.1 (16 Jul 2020 00:42), Max: 98.6 (15 Jul 2020 09:54)  HR: 65 (16 Jul 2020 00:42) (65 - 78)  BP: 130/80 (16 Jul 2020 00:42) (106/70 - 130/80)  BP(mean): --  RR: 18 (16 Jul 2020 00:42) (18 - 18)  SpO2: 99% (16 Jul 2020 00:42) (97% - 99%)                            9.1    8.62  )-----------( 582      ( 15 Jul 2020 07:14 )             28.7       07-15    141  |  105  |  14  ----------------------------<  74  4.1   |  24  |  0.57    Ca    9.7      15 Jul 2020 07:12        PE:  General: NAD  Abdomen: soft, NT, ND, Pfannenstiel incision nontender w/erythema and induration and some improvement in erythema from prior exam.  Extremities: SCDs in place, no erythema

## 2020-07-16 NOTE — CONSULT NOTE ADULT - ASSESSMENT
35F s/p  .  Course complicated by soft tissue infection post-op.  Cultures so far with staph aureus.  Most likely MSSA as she was already improving on ertapenem which should not treat mrsa.      Post-op infection with staph aureus  - ancef 1g q18  - hope to transition to keflex 500mg q6 once sensitivities are available to complete 14 days  - f/u sensi    I have discussed plan of care as detailed above with ob [id number]

## 2020-07-17 ENCOUNTER — TRANSCRIPTION ENCOUNTER (OUTPATIENT)
Age: 35
End: 2020-07-17

## 2020-07-17 VITALS
OXYGEN SATURATION: 100 % | RESPIRATION RATE: 18 BRPM | SYSTOLIC BLOOD PRESSURE: 128 MMHG | HEART RATE: 59 BPM | DIASTOLIC BLOOD PRESSURE: 83 MMHG | TEMPERATURE: 98 F

## 2020-07-17 LAB
ANION GAP SERPL CALC-SCNC: 12 MMOL/L — SIGNIFICANT CHANGE UP (ref 5–17)
BASOPHILS # BLD AUTO: 0.07 K/UL — SIGNIFICANT CHANGE UP (ref 0–0.2)
BASOPHILS NFR BLD AUTO: 0.8 % — SIGNIFICANT CHANGE UP (ref 0–2)
BUN SERPL-MCNC: 9 MG/DL — SIGNIFICANT CHANGE UP (ref 7–23)
CALCIUM SERPL-MCNC: 9.7 MG/DL — SIGNIFICANT CHANGE UP (ref 8.4–10.5)
CHLORIDE SERPL-SCNC: 105 MMOL/L — SIGNIFICANT CHANGE UP (ref 96–108)
CO2 SERPL-SCNC: 23 MMOL/L — SIGNIFICANT CHANGE UP (ref 22–31)
CREAT SERPL-MCNC: 0.59 MG/DL — SIGNIFICANT CHANGE UP (ref 0.5–1.3)
EOSINOPHIL # BLD AUTO: 0.31 K/UL — SIGNIFICANT CHANGE UP (ref 0–0.5)
EOSINOPHIL NFR BLD AUTO: 3.5 % — SIGNIFICANT CHANGE UP (ref 0–6)
GLUCOSE SERPL-MCNC: 80 MG/DL — SIGNIFICANT CHANGE UP (ref 70–99)
HCT VFR BLD CALC: 31.3 % — LOW (ref 34.5–45)
HGB BLD-MCNC: 9.8 G/DL — LOW (ref 11.5–15.5)
IMM GRANULOCYTES NFR BLD AUTO: 1.3 % — SIGNIFICANT CHANGE UP (ref 0–1.5)
LYMPHOCYTES # BLD AUTO: 2.56 K/UL — SIGNIFICANT CHANGE UP (ref 1–3.3)
LYMPHOCYTES # BLD AUTO: 29.3 % — SIGNIFICANT CHANGE UP (ref 13–44)
MCHC RBC-ENTMCNC: 28.2 PG — SIGNIFICANT CHANGE UP (ref 27–34)
MCHC RBC-ENTMCNC: 31.3 GM/DL — LOW (ref 32–36)
MCV RBC AUTO: 90.2 FL — SIGNIFICANT CHANGE UP (ref 80–100)
MONOCYTES # BLD AUTO: 0.67 K/UL — SIGNIFICANT CHANGE UP (ref 0–0.9)
MONOCYTES NFR BLD AUTO: 7.7 % — SIGNIFICANT CHANGE UP (ref 2–14)
NEUTROPHILS # BLD AUTO: 5.03 K/UL — SIGNIFICANT CHANGE UP (ref 1.8–7.4)
NEUTROPHILS NFR BLD AUTO: 57.4 % — SIGNIFICANT CHANGE UP (ref 43–77)
NRBC # BLD: 0 /100 WBCS — SIGNIFICANT CHANGE UP (ref 0–0)
PLATELET # BLD AUTO: 621 K/UL — HIGH (ref 150–400)
POTASSIUM SERPL-MCNC: 4.2 MMOL/L — SIGNIFICANT CHANGE UP (ref 3.5–5.3)
POTASSIUM SERPL-SCNC: 4.2 MMOL/L — SIGNIFICANT CHANGE UP (ref 3.5–5.3)
RBC # BLD: 3.47 M/UL — LOW (ref 3.8–5.2)
RBC # FLD: 13.2 % — SIGNIFICANT CHANGE UP (ref 10.3–14.5)
SODIUM SERPL-SCNC: 140 MMOL/L — SIGNIFICANT CHANGE UP (ref 135–145)
WBC # BLD: 8.75 K/UL — SIGNIFICANT CHANGE UP (ref 3.8–10.5)
WBC # FLD AUTO: 8.75 K/UL — SIGNIFICANT CHANGE UP (ref 3.8–10.5)

## 2020-07-17 PROCEDURE — 86850 RBC ANTIBODY SCREEN: CPT

## 2020-07-17 PROCEDURE — 84132 ASSAY OF SERUM POTASSIUM: CPT

## 2020-07-17 PROCEDURE — 86901 BLOOD TYPING SEROLOGIC RH(D): CPT

## 2020-07-17 PROCEDURE — 81001 URINALYSIS AUTO W/SCOPE: CPT

## 2020-07-17 PROCEDURE — 82330 ASSAY OF CALCIUM: CPT

## 2020-07-17 PROCEDURE — 85027 COMPLETE CBC AUTOMATED: CPT

## 2020-07-17 PROCEDURE — 87086 URINE CULTURE/COLONY COUNT: CPT

## 2020-07-17 PROCEDURE — 74177 CT ABD & PELVIS W/CONTRAST: CPT

## 2020-07-17 PROCEDURE — 84295 ASSAY OF SERUM SODIUM: CPT

## 2020-07-17 PROCEDURE — 85014 HEMATOCRIT: CPT

## 2020-07-17 PROCEDURE — 87070 CULTURE OTHR SPECIMN AEROBIC: CPT

## 2020-07-17 PROCEDURE — 86900 BLOOD TYPING SEROLOGIC ABO: CPT

## 2020-07-17 PROCEDURE — 99285 EMERGENCY DEPT VISIT HI MDM: CPT | Mod: 25

## 2020-07-17 PROCEDURE — 99232 SBSQ HOSP IP/OBS MODERATE 35: CPT

## 2020-07-17 PROCEDURE — 87040 BLOOD CULTURE FOR BACTERIA: CPT

## 2020-07-17 PROCEDURE — 83605 ASSAY OF LACTIC ACID: CPT

## 2020-07-17 PROCEDURE — 82803 BLOOD GASES ANY COMBINATION: CPT

## 2020-07-17 PROCEDURE — 80053 COMPREHEN METABOLIC PANEL: CPT

## 2020-07-17 PROCEDURE — 86769 SARS-COV-2 COVID-19 ANTIBODY: CPT

## 2020-07-17 PROCEDURE — 82435 ASSAY OF BLOOD CHLORIDE: CPT

## 2020-07-17 PROCEDURE — 96374 THER/PROPH/DIAG INJ IV PUSH: CPT | Mod: XU

## 2020-07-17 PROCEDURE — 87186 SC STD MICRODIL/AGAR DIL: CPT

## 2020-07-17 PROCEDURE — 82947 ASSAY GLUCOSE BLOOD QUANT: CPT

## 2020-07-17 PROCEDURE — 80048 BASIC METABOLIC PNL TOTAL CA: CPT

## 2020-07-17 RX ORDER — CEPHALEXIN 500 MG
1 CAPSULE ORAL
Qty: 40 | Refills: 0
Start: 2020-07-17 | End: 2020-07-26

## 2020-07-17 RX ADMIN — LORATADINE 10 MILLIGRAM(S): 10 TABLET ORAL at 12:15

## 2020-07-17 RX ADMIN — Medication 500 MILLIGRAM(S): at 09:52

## 2020-07-17 RX ADMIN — Medication 100 MILLIGRAM(S): at 04:39

## 2020-07-17 RX ADMIN — Medication 100 MILLIGRAM(S): at 10:45

## 2020-07-17 RX ADMIN — HEPARIN SODIUM 5000 UNIT(S): 5000 INJECTION INTRAVENOUS; SUBCUTANEOUS at 06:16

## 2020-07-17 RX ADMIN — Medication 325 MILLIGRAM(S): at 09:51

## 2020-07-17 RX ADMIN — Medication 1 TABLET(S): at 12:15

## 2020-07-17 NOTE — DISCHARGE NOTE PROVIDER - CARE PROVIDER_API CALL
Jigna Stearns)  Obstetrics and Gynecology  [address] [place] [address], [place] [address]  Phone: [phone]  Fax: [phone]  Follow Up Time:

## 2020-07-17 NOTE — DISCHARGE NOTE PROVIDER - NSDCFUADDINST_GEN_ALL_CORE_FT
After discharge, please stay on pelvic rest for 6 weeks from delivery, meaning no sexual intercourse, no tampons and no douching. Expect to have vaginal bleeding/spotting for up to six weeks.  The bleeding should get lighter and more white/light brown with time.  For bleeding soaking more than a pad an hour or passing clots greater than the size of your fist, come in to the emergency department. Call your Doctor for any fevers or chills.

## 2020-07-17 NOTE — PROGRESS NOTE ADULT - SUBJECTIVE AND OBJECTIVE BOX
Patient is a 35y old  Female who presents with a chief complaint of c/s wound infection (15 Jul 2020 16:09)    f/u soft tissue infection    Interval History/ROS:  no fever.  culture with mssa.  little pain.  no dysuria.  Remainder of ROS otherwise negative.    PAST MEDICAL & SURGICAL HISTORY:  Uterine polyp      Allergies  No Known Allergies    ANTIMICROBIALS:  clindamycin IVPB (7/13 x1)  ertapenem  IVPB (-)  ceFAZolin   IVPB 1000 every 8 hours (-)    MEDICATIONS  (STANDING):  acetaminophen   Tablet .. 975 <User Schedule>  diphtheria/tetanus/pertussis (acellular) Vaccine (ADAcel) 0.5 once  heparin   Injectable 5000 every 12 hours  ibuprofen  Tablet. 600 every 6 hours  loratadine 10 daily  oxytocin Infusion 333.333 <Continuous>  senna 2 at bedtime    Vital Signs Last 24 Hrs  T(F): 98.1 (20 @ 12:52), Max: 98.4 (20 @ 17:00)  HR: 59 (20 @ 12:52)  BP: 128/83 (20 @ 12:52)  RR: 18 (20 @ 12:52)  SpO2: 100% (20 @ 12:52) (96% - 100%)    PHYSICAL EXAM:  Constitutional: non-toxic  HEAD/EYES: anicteric  ENT:  supple  Cardiovascular:  not tachycardid  Respiratory:  not tachypneic  GI:  soft, non-tender, nearly resolved erythema of the  incision, no drainage  :  no marrufo  Musculoskeletal:  no synovitis  Neurologic: awake and alert, normal strength  Skin:  no rash, no erythema, no phlebitis  Psychiatric:  awake, alert, appropriate mood                        9.8    8.75  )-----------( 621      ( 2020 06:36 )             31.3 07-17    140  |  105  |  9   ----------------------------<  80  4.2   |  23  |  0.59  Ca    9.7      2020 06:36    MICROBIOLOGY:  Culture - Surgical Swab (collected 15 Jul 2020 00:21)  Source: .Surgical Swab Wound  Preliminary Report (15 Jul 2020 19:05):    Moderate Staphylococcus aureus MSSA    Culture - Blood (collected 2020 14:49)  Source: .Blood Blood-Venous  Preliminary Report (15 Jul 2020 15:06):    No growth to date.    Culture - Blood (collected 2020 14:49)  Source: .Blood Blood-Peripheral  Preliminary Report (15 Jul 2020 15:06):    No growth to date.    Culture - Urine (collected 2020 14:42)  Source: .Urine Clean Catch (Midstream)  Final Report (15 Jul 2020 10:59):    <10,000 CFU/mL Normal Urogenital Felicitas    RADIOLOGY:  imaging below personally reviewed and agree with findings    CT Abdomen and Pelvis w/ IV Cont (20 @ 23:25) >  ABDOMINAL WALL: Nonspecific stranding along the anterior pelvic wall reflecting postsurgical changes. Small rim-enhancing fluid collections along the anterior pelvic wall, for example, 1.7 x 1.0 x 2.2 cm on the right (3:79) and 4.4 x 0.8 x 2.4 cm on the left) (3:76).  IMPRESSION:  Nonspecific stranding along the anterior pelvic wall reflecting postsurgical changes. Small fluid collections along the anterior pelvic wall as described, which may represent a hematoma. Recommend clinical correlation to assess underlying infection (cellulitis/abscess). Nonspecific heterogeneous enhancement of the uterus. Prominent endometrium. Recommend clinical correlation to assess myometritis/endometritis.  Diffusely prominent bladder wall, which may be due to partial distention. Recommend clinical correlation to assess urinary tract infection.  Prominent wall of the distal stomach, which may be due to partial distention and/or gastritis. Recommend clinical correlation. If clinically indicated, follow-up endoscopy may be obtained for further evaluation.    Additional findings as described.

## 2020-07-17 NOTE — PROGRESS NOTE ADULT - SUBJECTIVE AND OBJECTIVE BOX
EDUARDO Progress Note      36yo POD#18 s/p pLTCS re-admitted with cellulitis at incision. Patient reports improving erythema and pain around incision. She denies fevers, chills, abdominal pain, n/v. She is tolerating a regular diet. Voiding spontaneously. Denies CP/SOB/lightheadedness/dizziness.     Vital Signs Last 24 Hrs  T(C): 36.7 (17 Jul 2020 00:25), Max: 37 (16 Jul 2020 05:27)  T(F): 98 (17 Jul 2020 00:25), Max: 98.6 (16 Jul 2020 05:27)  HR: 74 (17 Jul 2020 00:25) (61 - 80)  BP: 116/77 (17 Jul 2020 00:25) (111/69 - 130/74)  BP(mean): --  RR: 18 (17 Jul 2020 00:25) (18 - 18)  SpO2: 99% (17 Jul 2020 00:25) (97% - 100%)                            9.3    8.81  )-----------( 508      ( 16 Jul 2020 07:19 )             28.7       07-16    140  |  105  |  10  ----------------------------<  69<L>  4.3   |  24  |  0.58    Ca    9.5      16 Jul 2020 07:19      PE:  General: NAD, AOx3   Abdomen: soft, NT, ND, Pfannenstiel incision nontender w/erythema and induration and improvement in erythema from prior exam.  Extremities: SCDs in place, no erythema

## 2020-07-17 NOTE — PROGRESS NOTE ADULT - ASSESSMENT
35F s/p  .  Course complicated by soft tissue infection post-op.  Cultures so far with staph aureus (+) MSSA.      Post-op infection with staph aureus  - ancef 1g q8, can be transitioned to keflex 500mg q6 to complete 14 days (10 more days)  - d/c planning today okay with ID    I have discussed plan of care as detailed above with ob [id number]    Please call the ID service [phone] with questions or concerns over the weekend
35y  POD#17 from pLTCS () a/w cellulitis at incision. Patient afebrile overnight with improving cellulitis and downtrending leukocytosis.
35y  POD#18 from pLTCS () a/w cellulitis at incision. Patient afebrile overnight with improving cellulitis and downtrending leukocytosis.
35y  POD#15 from pLTCS () a/w cellulitis at incision. Patient afebrile overnight with improving cellulitis and downtrending leukocytosis.

## 2020-07-17 NOTE — DISCHARGE NOTE NURSING/CASE MANAGEMENT/SOCIAL WORK - PATIENT PORTAL LINK FT
You can access the FollowMyHealth Patient Portal offered by [facility] by registering at the following website: [email or web address]. By joining [employer]’s FollowMyHealth portal, you will also be able to view your health information using other applications (apps) compatible with our system.

## 2020-07-17 NOTE — PROGRESS NOTE ADULT - PROBLEM SELECTOR PLAN 1
- Continue Invanz (7/14-)   - Tylenol, Motrin PRN for pain   - F/u AM CBC+diff   - F/u Bcx, Ucx, wound cx   - Trend fevers, leukocytosis   - Monitor vital signs per routine   - HSQ for DVT ppx
- WBC: 12.62->10.93->8.62   - Wound cx: S Aureus (p). F/u final cx   - Bcx: NGTD (p)   - Invanz (7/14-). Consider switching to PO Abx today   - Tylenol, Motrin PRN for pain   - F/u AM CBC+diff    - Monitor vital signs per routine   - HSQ for DVT ppx
WBC: 12.62->10.93->8.62->8.81  - Wound cx: S Aureus sensitive to Ancef  - Ancef (7/16-), s/p Invanz (7/15-16)    - Consider switching to PO Abx today   - Bcx: NGTD (p), Ucx: neg  - F/u AM CBC+diff    - Monitor vital signs per routine   - Tylenol, Motrin PRN for pain   - HSQ for DVT ppx  - Appreciate ID recs

## 2020-07-17 NOTE — DISCHARGE NOTE NURSING/CASE MANAGEMENT/SOCIAL WORK - NSDCPNINST_GEN_ALL_CORE
Start antibiotics and continue to put abdominal pads over incision. Call MD for any increased pain, drainage or bleeding from the incision site. Alert MD of any fever. Continue to use hot packs on right arm where IV infiltrated. If swelling does not decrease, notify MD.

## 2020-07-17 NOTE — DISCHARGE NOTE PROVIDER - HOSPITAL COURSE
patient is a 34yo  admitted on  POD#14 s/p pLTCS () with cellulitis. Patient received clindamycin x1 day () then transitioned to invanz (-) for extended coverage and finally ancef (-) once wound culture resulted S. Aureus as recommended by ID. Patient remained afebrile throughout hospital course and was discharged home on HD#4 on PO antibiotics (keflex) in stable condition. WBC 12.62->10.93->8.62->8.81

## 2020-07-17 NOTE — DISCHARGE NOTE PROVIDER - NSDCMRMEDTOKEN_GEN_ALL_CORE_FT
acetaminophen 325 mg oral tablet: 3 tab(s) orally   cephalexin 500 mg oral tablet: 1 tab(s) orally every 6 hours   ibuprofen 600 mg oral tablet: 1 tab(s) orally every 6 hours  Prenatal Multivitamins oral tablet:   simethicone 80 mg oral tablet, chewable: 1 tab(s) orally every 4 hours, As needed, Gas

## 2020-07-19 LAB
CULTURE RESULTS: SIGNIFICANT CHANGE UP
ORGANISM # SPEC MICROSCOPIC CNT: SIGNIFICANT CHANGE UP
ORGANISM # SPEC MICROSCOPIC CNT: SIGNIFICANT CHANGE UP
SPECIMEN SOURCE: SIGNIFICANT CHANGE UP

## 2020-09-30 ENCOUNTER — APPOINTMENT (OUTPATIENT)
Dept: OTOLARYNGOLOGY | Facility: CLINIC | Age: 35
End: 2020-09-30
Payer: COMMERCIAL

## 2020-09-30 ENCOUNTER — TRANSCRIPTION ENCOUNTER (OUTPATIENT)
Age: 35
End: 2020-09-30

## 2020-09-30 PROCEDURE — 99214 OFFICE O/P EST MOD 30 MIN: CPT | Mod: 95

## 2020-09-30 NOTE — CONSULT LETTER
[Dear  ___] : Dear  [unfilled], [Consult Letter:] : I had the pleasure of evaluating your patient, [unfilled]. [Please see my note below.] : Please see my note below. [Consult Closing:] : Thank you very much for allowing me to participate in the care of this patient.  If you have any questions, please do not hesitate to contact me. [Sincerely,] : Sincerely, [FreeTextEntry3] : Dionna Valdez MD\par Otolaryngology and Cranial Base Surgery\par Attending Physician - Department of Otolaryngology and Head & Neck Surgery\par [facility]\par \par [employer] [facility]

## 2020-09-30 NOTE — HISTORY OF PRESENT ILLNESS
[de-identified] : 34yo female seen while pregnant who had BRISEIDA that resolved. She notes that she has had her baby and he is 13 weeks old. She has had persistent pulsatile tinnitus in both ears since this happened and notes it hasn’t improved despite having the baby. She notes she had a few days after birth where it was much louder then it went back to being less but still present. She feels it is both ears and can feel and hear a heartbeat sound. She has a history of migraines but no other neurologic symptoms. She notes her hearing is still good and no ear pain. [Home] : at home, [unfilled] , at the time of the visit. [Medical Office: ([facility])___] : at the medical office located in  [Verbal consent obtained from patient] : the patient, [unfilled]

## 2020-09-30 NOTE — ASSESSMENT
[FreeTextEntry1] : resolved BRISEIDA with persistent pulsatile tinnitus:\par - advised that we could see her back to go ahead and recheck ears and audio, however if she feels no changes from last visit unlikely will be revealing\par - recommend cont nasal saline irrigation and start flonase BID and claritin daily to treat any underlying ETD\par - referred to vascular neurology for imaging and workup of pulsatile tinnitus\par - f/u PRN\par

## 2020-09-30 NOTE — PHYSICAL EXAM
[de-identified] : Constitutional: Well appearing, no acute distress\par Head: Normocephalic, atraumatic\par Face: no swelling or asymmetry\par Eyes: EOM normal, conjunctiva normal, no scleral icterus\par Nares: no congestion\par Oral cavity: good dentition, tongue without lesions\par Neck: ROM normal, no JVD\par Respiratory: Normal respiratory effort, no distress, speaking in full sentences\par Musculoskeletal: ROM normal, no edema\par Neuro: Alert, oriented, no gross CN deficit, normal coordination\par Skin: well perfused, no rash\par Psych: Mood/affect normal, behavior normal

## 2020-10-05 ENCOUNTER — APPOINTMENT (OUTPATIENT)
Dept: NEUROLOGY | Facility: CLINIC | Age: 35
End: 2020-10-05
Payer: COMMERCIAL

## 2020-10-05 VITALS
DIASTOLIC BLOOD PRESSURE: 83 MMHG | HEIGHT: 60 IN | WEIGHT: 125 LBS | HEART RATE: 79 BPM | BODY MASS INDEX: 24.54 KG/M2 | SYSTOLIC BLOOD PRESSURE: 122 MMHG

## 2020-10-05 VITALS — TEMPERATURE: 97.9 F

## 2020-10-05 PROCEDURE — 99215 OFFICE O/P EST HI 40 MIN: CPT

## 2020-10-05 RX ORDER — SUMATRIPTAN 25 MG/1
25 TABLET, FILM COATED ORAL
Qty: 8 | Refills: 2 | Status: DISCONTINUED | COMMUNITY
Start: 2018-05-09 | End: 2020-10-05

## 2020-10-05 RX ORDER — NAPROXEN 500 MG/1
500 TABLET ORAL
Qty: 60 | Refills: 2 | Status: DISCONTINUED | COMMUNITY
Start: 2017-02-22 | End: 2020-10-05

## 2020-10-21 ENCOUNTER — APPOINTMENT (OUTPATIENT)
Dept: OPHTHALMOLOGY | Facility: CLINIC | Age: 35
End: 2020-10-21
Payer: COMMERCIAL

## 2020-10-21 ENCOUNTER — NON-APPOINTMENT (OUTPATIENT)
Age: 35
End: 2020-10-21

## 2020-10-21 PROCEDURE — 92133 CPTRZD OPH DX IMG PST SGM ON: CPT

## 2020-10-21 PROCEDURE — 99072 ADDL SUPL MATRL&STAF TM PHE: CPT

## 2020-10-21 PROCEDURE — 92083 EXTENDED VISUAL FIELD XM: CPT

## 2020-10-21 PROCEDURE — 92004 COMPRE OPH EXAM NEW PT 1/>: CPT

## 2020-10-27 ENCOUNTER — APPOINTMENT (OUTPATIENT)
Dept: MRI IMAGING | Facility: CLINIC | Age: 35
End: 2020-10-27

## 2020-10-27 ENCOUNTER — APPOINTMENT (OUTPATIENT)
Dept: CT IMAGING | Facility: CLINIC | Age: 35
End: 2020-10-27

## 2021-01-01 NOTE — DISCHARGE NOTE OB - CARE PROVIDER_API CALL
English Harriet oBlivar  Obstetrics and Gynecology  [address] [place] [address]  Phone: [phone]  Fax: [phone]  Follow Up Time:

## 2021-02-04 ENCOUNTER — APPOINTMENT (OUTPATIENT)
Dept: NEUROLOGY | Facility: CLINIC | Age: 36
End: 2021-02-04
Payer: COMMERCIAL

## 2021-02-04 PROCEDURE — 99214 OFFICE O/P EST MOD 30 MIN: CPT | Mod: 95

## 2021-02-04 RX ORDER — RIBOFLAVIN (VITAMIN B2) 400 MG
400 TABLET ORAL
Qty: 1 | Refills: 3 | Status: ACTIVE | COMMUNITY
Start: 2021-02-04 | End: 1900-01-01

## 2021-02-04 NOTE — PHYSICAL EXAM
[Person] : oriented to person [Place] : oriented to place [Time] : oriented to time [Short Term Intact] : short term memory intact [Span Intact] : the attention span was normal [Naming Objects] : no difficulty naming common objects [Fluency] : fluency intact [Current Events] : adequate knowledge of current events [Cranial Nerves Oculomotor (III)] : extraocular motion intact [Cranial Nerves Facial (VII)] : face symmetrical [Cranial Nerves Vestibulocochlear (VIII)] : hearing was intact bilaterally [Paresis Pronator Drift Right-Sided] : no pronator drift on the right [Paresis Pronator Drift Left-Sided] : no pronator drift on the left

## 2021-02-04 NOTE — HISTORY OF PRESENT ILLNESS
[FreeTextEntry1] : verbal consent given on 02/04/2021 and 14:52  by RYAN BILLS at [address] [place] [address]\[place] [address]\par \par \par 35 W who has seen Dr. Blackmon in Jan. She has symptoms suggestive of migraine without aura. She never tried the topamax as she's not interested in a daily medication. Her migraines occur the first day of her menses. Sometimes the Excedrin migraine works. She thinks stress also worsens her migraines. At times she vomits and at other times the migraine lasts all day. Location: between her eyebrows. \par \par she didn't bring the MRI report that was done recently. \par \par interval history: She tried the mag oxide but it didn't help the menstrual migraines. She stopped it because she thought it was hurting her stomach. in hindsight, it may have been the excedrin migraine that she's using. She still has headaches about 2-3 a week. \par \par interval history: She is currently 14 weeks pregnant with her first child. She has been fine with exception of a migraine that lasted 5 days. We discussed safe options during pregnancy if she has migraines. \par \par Interval history: Since her last visit with me patient saw Ms. Burns for pulsatile tinnitus however patient states that that has resolved and she did not obtain any of the imaging. Patient's migraines returned after the birth of her child. Patient has it about once a week or once every other week. \par \par CONSENT FOR VIDEO MEDICAL VISIT1. I understand that my physician wishes me to engage in a telemedicine consultation.2. My physician has explained to me how the video conferencing technology will be used to affect such a consultation will not be the same as a direct patient/health care provider visit due to the fact that I will not be in the same room as my physician 3. I understand there are potential risks to this technology, including interruptions, unauthorized access and technical difficulties. I understand that my health care provider or I can discontinue the telemedicine consult/visit if it is felt that the videoconferencing connections are not adequate for the situation.4. I understand that my healthcare information may be shared with other individuals for scheduling and billing purposes. Others may also be present during the consultation other than my physician and consulting health care provider in order to operate the video equipment. The above mentioned people will all maintain confidentiality of the information obtained. I further understand that I will be informed of their presence in the consultation and thus will have the right to request the following: (1) omit specific details of my medical history/physical examination that are personally sensitive to me; (2) ask non-medical personnel to leave the telemedicine examination room: and or (3) terminate the consultation at any time.5. I have had the alternatives to a telemedicine consultation explained to me, and in choosing to participate in a telemedicine consultation. I understand that some parts of the exam involving physical tests may be conducted by individuals at my location at the direction of the consulting health care provider.6. In an emergent consultation, I understand that the responsibility of the telemedicine consulting specialist is to advise my local practitioner and that the specialist’s responsibility will conclude upon the termination of the video conference connection.7. I understand that billing will occur from both my physician and as a facility fee from the site from which I am presented.8. I have had a direct conversation with my physician, during which I had the opportunity to ask questions in regard to this procedure. My questions have been answered and the risks, benefits and any practical alternatives have been discussed with me in a language in which I understand\par \par

## 2021-04-10 ENCOUNTER — NON-APPOINTMENT (OUTPATIENT)
Age: 36
End: 2021-04-10

## 2021-04-10 NOTE — HISTORY OF PRESENT ILLNESS
[FreeTextEntry1] : Ms. Tavares is a 35 year old female PMHx migraines (for about 20 years) who presents today for consultation regarding pulsatile tinnitus.\par \par She reports that for about 20 years she has had migraines without aura. She has been seen by neurology in the past who recommended Naprosyn and triptans but she only takes Excedrin when needed. She states that the headaches occur weekly and last for several hours. When she was about 20 weeks pregnant (2/2020) she developed an URI and BL middle ear effusion. At that time she noticed a "pulsating" sound in her ears (unsure if BL). She states that it occurred throughout most the day. Over time the effusions improved but the tinnitus remained. She was told that once she delivers it may resolve but it has not. Around the same time as the ear infections she started to have visual auras with her migraines. She describes the aura as black dots in both eyes lasting a few seconds. They are almost always associated with a migraines after it. Since giving birth the tinnitus occurs mostly at night while laying down. She reports that it sounds like a heart beating in her ear. \par \par Overall she feels well. She denies any unilateral weakness, diplopia, dizziness, dysarthria. Her last eye exam was years ago. \par

## 2021-04-10 NOTE — PHYSICAL EXAM
[General Appearance - Alert] : alert [General Appearance - In No Acute Distress] : in no acute distress [Oriented To Time, Place, And Person] : oriented to person, place, and time [Impaired Insight] : insight and judgment were intact [Affect] : the affect was normal [Person] : oriented to person [Place] : oriented to place [Time] : oriented to time [Short Term Intact] : short term memory intact [Concentration Intact] : normal concentrating ability [Visual Intact] : visual attention was ~T not ~L decreased [Naming Objects] : no difficulty naming common objects [Repeating Phrases] : no difficulty repeating a phrase [Fluency] : fluency intact [Cranial Nerves Optic (II)] : visual acuity intact bilaterally,  visual fields full to confrontation, pupils equal round and reactive to light [Cranial Nerves Oculomotor (III)] : extraocular motion intact [Cranial Nerves Trigeminal (V)] : facial sensation intact symmetrically [Cranial Nerves Facial (VII)] : face symmetrical [Cranial Nerves Vestibulocochlear (VIII)] : hearing was intact bilaterally [Cranial Nerves Glossopharyngeal (IX)] : tongue and palate midline [Cranial Nerves Accessory (XI - Cranial And Spinal)] : head turning and shoulder shrug symmetric [Cranial Nerves Hypoglossal (XII)] : there was no tongue deviation with protrusion [Motor Tone] : muscle tone was normal in all four extremities [Motor Strength] : muscle strength was normal in all four extremities [No Muscle Atrophy] : normal bulk in all four extremities [Motor Handedness Right-Handed] : the patient is right hand dominant [Sensation Tactile Decrease] : light touch was intact [Balance] : balance was intact [Sclera] : the sclera and conjunctiva were normal [PERRL With Normal Accommodation] : pupils were equal in size, round, reactive to light, with normal accommodation [Optic Disc Not Visualized] : not visualized [Outer Ear] : the ears and nose were normal in appearance [] : no respiratory distress [Edema] : there was no peripheral edema [Abnormal Walk] : normal gait [Skin Color & Pigmentation] : normal skin color and pigmentation [Paresis Pronator Drift Right-Sided] : no pronator drift on the right [Paresis Pronator Drift Left-Sided] : no pronator drift on the left [Motor Strength Upper Extremities Bilaterally] : strength was normal in both upper extremities [Motor Strength Lower Extremities Bilaterally] : strength was normal in both lower extremities [Past-pointing] : there was no past-pointing [Tremor] : no tremor present [Dysdiadochokinesia Bilaterally] : not present [Coordination - Dysmetria Impaired Finger-to-Nose Bilateral] : not present [Coordination - Dysmetria Impaired Heel-to-Shin Bilateral] : not present [Plantar Reflex Right Only] : normal on the right [Plantar Reflex Left Only] : normal on the left [FreeTextEntry6] : 5/5 in all extremities

## 2021-04-10 NOTE — DISCUSSION/SUMMARY
[Patient encouraged to discuss with Primary MD] : I encouraged the patient to discuss these important issues with ~his/her~ primary care doctor [Goals and Counseling] : I have reviewed the goals of stroke risk factor modification. I counseled the patient on measures to reduce stroke risk, including the importance of medication compliance, risk factor control, exercise, healthy diet and avoidance of smoking. I reviewed stroke warning signs and symptoms and appropriate actions to take if such occur. [FreeTextEntry1] : Impression:\par Patient with pulsatile tinnitus etiology include extra/intracranial artery stenosis, dural AV fistula, venous sinus thrombosis, aberrant ICA, dehiscent/high riding jugular bulb, glomus tympanicum, pseudotumor cerebri \par \par - Referral made to neuro opthalmology for detailed eye exam and to assess for any papilledema \par - CT temp bone w/o contrast to assess for above differential diagnoses\par - MRI head, MRI brain and IAC with contrast, MRA/MRV with contrast, MRA head wo, MRA neck w/wo assess for above differential diagnoses\par - Follow up with PCP for statin management and primary care needs such as regular blood work including monitoring of HbA1c and cholesterol profile (goal LDL <70), to modify vascular risk factors as well as appropriate routine malignancy screenings \par - Screened patient for sleep apnea with no identifiable risk factors at this time. Will reevaluate next visit \par - Discussed traveling and the importance of hydration, frequent ambulation and medication compliance with positive understanding \par - Follow up in 6-8 week with first available Neurovascular attending\par \par \par Above mentioned plan was discussed with patient in detail. I have answered all the patients questions to the best of my abilities. I have reviewed measures for secondary stroke prevention with the patient and available family members, including aggressive vascular risk factors modification, healthy diet, regular exercise and medication compliance. As well as discussed the need for calling 911 immediately in case of any symptoms concerning for stroke in the future. \par \par Also advised to contact me upon completion of imaging studies and/or laboratory testing/investigations to discuss the results over the phone

## 2021-04-19 ENCOUNTER — APPOINTMENT (OUTPATIENT)
Dept: NEUROLOGY | Facility: CLINIC | Age: 36
End: 2021-04-19
Payer: COMMERCIAL

## 2021-04-19 VITALS
BODY MASS INDEX: 23.56 KG/M2 | HEART RATE: 64 BPM | WEIGHT: 120 LBS | SYSTOLIC BLOOD PRESSURE: 128 MMHG | HEIGHT: 60 IN | DIASTOLIC BLOOD PRESSURE: 80 MMHG

## 2021-04-19 VITALS
SYSTOLIC BLOOD PRESSURE: 130 MMHG | DIASTOLIC BLOOD PRESSURE: 80 MMHG | WEIGHT: 120 LBS | BODY MASS INDEX: 23.56 KG/M2 | TEMPERATURE: 97.2 F | HEART RATE: 64 BPM | HEIGHT: 60 IN

## 2021-04-19 VITALS — TEMPERATURE: 97.2 F

## 2021-04-19 PROCEDURE — 99072 ADDL SUPL MATRL&STAF TM PHE: CPT

## 2021-04-19 PROCEDURE — 99215 OFFICE O/P EST HI 40 MIN: CPT

## 2021-04-19 NOTE — PHYSICAL EXAM
[General Appearance - Alert] : alert [General Appearance - In No Acute Distress] : in no acute distress [Oriented To Time, Place, And Person] : oriented to person, place, and time [Impaired Insight] : insight and judgment were intact [Affect] : the affect was normal [Sclera] : the sclera and conjunctiva were normal [Extraocular Movements] : extraocular movements were intact [PERRL With Normal Accommodation] : pupils were equal in size, round, reactive to light, with normal accommodation [Outer Ear] : the ears and nose were normal in appearance [Oropharynx] : the oropharynx was normal [Neck Appearance] : the appearance of the neck was normal [Neck Cervical Mass (___cm)] : no neck mass was observed [Jugular Venous Distention Increased] : there was no jugular-venous distention [Thyroid Diffuse Enlargement] : the thyroid was not enlarged [Thyroid Nodule] : there were no palpable thyroid nodules [Auscultation Breath Sounds / Voice Sounds] : lungs were clear to auscultation bilaterally [Heart Rate And Rhythm] : heart rate was normal and rhythm regular [Heart Sounds] : normal S1 and S2 [Heart Sounds Gallop] : no gallops [Murmurs] : no murmurs [Heart Sounds Pericardial Friction Rub] : no pericardial rub [Arterial Pulses Carotid] : carotid pulses were normal with no bruits [Edema] : there was no peripheral edema [Veins - Varicosity Changes] : there were no varicosital changes [No CVA Tenderness] : no ~M costovertebral angle tenderness [FreeTextEntry1] : No cranial bruits [No Spinal Tenderness] : no spinal tenderness [Abnormal Walk] : normal gait [Nail Clubbing] : no clubbing  or cyanosis of the fingernails [Musculoskeletal - Swelling] : no joint swelling seen [Motor Tone] : muscle strength and tone were normal [Skin Color & Pigmentation] : normal skin color and pigmentation [Skin Turgor] : normal skin turgor [] : no rash

## 2021-04-19 NOTE — HISTORY OF PRESENT ILLNESS
[FreeTextEntry1] : 36-year-old right-handed lady with pulsatile tinnitus\par \par Summary from NP Fanny Burns's note dated 10/5/2020-with modification by me\par \par She has a PMHx migraines (for about 20 years) who presents for  pulsatile tinnitus.\par \par She reports that for about 20 years she has had migraines without aura.  She only takes Excedrin when needed. She states that the headaches occur weekly and last for several hours. When she was about 20 weeks pregnant (2/2020) she  noticed a "pulsating" sound in her ears (unsure if BL). Around the same time she started to have visual auras with her migraines. She describes the aura as black dots in both eyes lasting a few seconds. Since giving birth the tinnitus occurs mostly at night while laying down like a heart beating in her ear. \par \par Overall she feels well.\par \par 4/19/2021.  She came to the office today.  She continues to note pulsatile tinnitus, although she is unable to lateralize the sound.\par \par She had been evaluated by Dr. Santana (neuro-ophthalmology 10/21/2020) with no evidence of papilledema\par \par No imaging done as had been suggested by Fanny, as she was reluctant to proceed with contrast studies while breast-feeding.

## 2021-04-19 NOTE — REVIEW OF SYSTEMS
[Feeling Tired] : feeling tired [Recent Weight Loss (___ Lbs)] : recent [unfilled] ~Ulb weight loss [As Noted in HPI] : as noted in HPI [Negative] : Heme/Lymph [FreeTextEntry2] : Mild daytime fatigue and minimal snoring when congested; deliberate weight loss

## 2021-04-19 NOTE — CONSULT LETTER
[Dear  ___] : Dear  [unfilled], [Consult Letter:] : I had the pleasure of evaluating your patient, [unfilled]. [Please see my note below.] : Please see my note below. [Consult Closing:] : Thank you very much for allowing me to participate in the care of this patient.  If you have any questions, please do not hesitate to contact me. [Sincerely,] : Sincerely, [FreeTextEntry2] : David Singer MD

## 2021-04-19 NOTE — DISCUSSION/SUMMARY
[FreeTextEntry1] : Summary from PIETRO Burns's note dated 10/5/2020-revised by me\par \par Impression:\par She has pulsatile tinnitus.  Differential diagnosis includes extra/intracranial artery stenosis, dural AV fistula, venous sinus thrombosis, aberrant ICA, dehiscent/high riding jugular bulb, glomus tympanicum, pseudotumor cerebri \par \par - Referral made to neuro opthalmology to assess for any papilledema \par - CT temp bone w/o contrast \par - MRI brain and IAC with contrast, MRV head, MRA head/MRA neck \par \par - Screened patient for sleep apnea with no identifiable risk factors at this time.\par \par 4/19/2021.  Overall she is neurologically intact and doing well.\par \par She has not yet had her imaging studies and was reluctant to proceed with contrast studies given that she is breast-feeding.  I reordered all imaging studies without contrast.  She should also proceed with Dopplers which can sometimes provide a clue that there is a dural AV fistula.\par \par We discussed the differential diagnosis and clinical implications of various causes of pulsatile tinnitus.\par \par She can follow-up with PIETRO Burns in 2-3 months, and with me as needed.  I hope that she remains free of serious trouble.

## 2021-05-17 ENCOUNTER — APPOINTMENT (OUTPATIENT)
Dept: NEUROLOGY | Facility: CLINIC | Age: 36
End: 2021-05-17
Payer: COMMERCIAL

## 2021-05-17 PROCEDURE — 93886 INTRACRANIAL COMPLETE STUDY: CPT

## 2021-05-17 PROCEDURE — 93880 EXTRACRANIAL BILAT STUDY: CPT

## 2021-05-17 PROCEDURE — 99072 ADDL SUPL MATRL&STAF TM PHE: CPT

## 2021-05-17 PROCEDURE — 93892 TCD EMBOLI DETECT W/O INJ: CPT

## 2021-05-18 ENCOUNTER — NON-APPOINTMENT (OUTPATIENT)
Age: 36
End: 2021-05-18

## 2021-05-18 ENCOUNTER — TRANSCRIPTION ENCOUNTER (OUTPATIENT)
Age: 36
End: 2021-05-18

## 2021-05-22 ENCOUNTER — OUTPATIENT (OUTPATIENT)
Dept: OUTPATIENT SERVICES | Facility: HOSPITAL | Age: 36
LOS: 1 days | End: 2021-05-22
Payer: COMMERCIAL

## 2021-05-22 ENCOUNTER — RESULT REVIEW (OUTPATIENT)
Age: 36
End: 2021-05-22

## 2021-05-22 ENCOUNTER — APPOINTMENT (OUTPATIENT)
Dept: MRI IMAGING | Facility: IMAGING CENTER | Age: 36
End: 2021-05-22
Payer: COMMERCIAL

## 2021-05-22 ENCOUNTER — APPOINTMENT (OUTPATIENT)
Dept: CT IMAGING | Facility: IMAGING CENTER | Age: 36
End: 2021-05-22
Payer: COMMERCIAL

## 2021-05-22 DIAGNOSIS — H93.A3 PULSATILE TINNITUS, BILATERAL: ICD-10-CM

## 2021-05-22 PROCEDURE — 70480 CT ORBIT/EAR/FOSSA W/O DYE: CPT | Mod: 26

## 2021-05-22 PROCEDURE — 70547 MR ANGIOGRAPHY NECK W/O DYE: CPT | Mod: 26

## 2021-05-22 PROCEDURE — 70551 MRI BRAIN STEM W/O DYE: CPT | Mod: 26

## 2021-05-22 PROCEDURE — 70544 MR ANGIOGRAPHY HEAD W/O DYE: CPT

## 2021-05-22 PROCEDURE — 70544 MR ANGIOGRAPHY HEAD W/O DYE: CPT | Mod: 26,59

## 2021-05-22 PROCEDURE — 70547 MR ANGIOGRAPHY NECK W/O DYE: CPT

## 2021-05-22 PROCEDURE — 70551 MRI BRAIN STEM W/O DYE: CPT

## 2021-05-22 PROCEDURE — 70480 CT ORBIT/EAR/FOSSA W/O DYE: CPT

## 2021-05-25 ENCOUNTER — NON-APPOINTMENT (OUTPATIENT)
Age: 36
End: 2021-05-25

## 2021-05-27 ENCOUNTER — APPOINTMENT (OUTPATIENT)
Dept: NEUROLOGY | Facility: CLINIC | Age: 36
End: 2021-05-27
Payer: COMMERCIAL

## 2021-05-27 PROCEDURE — 99212 OFFICE O/P EST SF 10 MIN: CPT | Mod: 95

## 2021-06-01 ENCOUNTER — TRANSCRIPTION ENCOUNTER (OUTPATIENT)
Age: 36
End: 2021-06-01

## 2021-06-01 ENCOUNTER — NON-APPOINTMENT (OUTPATIENT)
Age: 36
End: 2021-06-01

## 2021-09-20 ENCOUNTER — NON-APPOINTMENT (OUTPATIENT)
Age: 36
End: 2021-09-20

## 2021-10-08 ENCOUNTER — APPOINTMENT (OUTPATIENT)
Dept: HUMAN REPRODUCTION | Facility: CLINIC | Age: 36
End: 2021-10-08

## 2021-10-09 ENCOUNTER — TRANSCRIPTION ENCOUNTER (OUTPATIENT)
Age: 36
End: 2021-10-09

## 2021-10-14 ENCOUNTER — APPOINTMENT (OUTPATIENT)
Dept: HUMAN REPRODUCTION | Facility: CLINIC | Age: 36
End: 2021-10-14
Payer: COMMERCIAL

## 2021-10-14 PROCEDURE — 99205 OFFICE O/P NEW HI 60 MIN: CPT | Mod: 25

## 2021-10-14 PROCEDURE — 36415 COLL VENOUS BLD VENIPUNCTURE: CPT

## 2021-10-14 PROCEDURE — 76830 TRANSVAGINAL US NON-OB: CPT

## 2021-10-26 ENCOUNTER — APPOINTMENT (OUTPATIENT)
Dept: HUMAN REPRODUCTION | Facility: CLINIC | Age: 36
End: 2021-10-26

## 2021-11-15 ENCOUNTER — APPOINTMENT (OUTPATIENT)
Dept: RADIOLOGY | Facility: HOSPITAL | Age: 36
End: 2021-11-15

## 2021-11-15 ENCOUNTER — APPOINTMENT (OUTPATIENT)
Dept: HUMAN REPRODUCTION | Facility: CLINIC | Age: 36
End: 2021-11-15
Payer: COMMERCIAL

## 2021-11-15 ENCOUNTER — OUTPATIENT (OUTPATIENT)
Dept: OUTPATIENT SERVICES | Facility: HOSPITAL | Age: 36
LOS: 1 days | End: 2021-11-15
Payer: COMMERCIAL

## 2021-11-15 ENCOUNTER — RESULT REVIEW (OUTPATIENT)
Age: 36
End: 2021-11-15

## 2021-11-15 DIAGNOSIS — N97.9 FEMALE INFERTILITY, UNSPECIFIED: ICD-10-CM

## 2021-11-15 PROCEDURE — 74740 X-RAY FEMALE GENITAL TRACT: CPT | Mod: 26

## 2021-11-15 PROCEDURE — 74740 X-RAY FEMALE GENITAL TRACT: CPT

## 2021-11-15 PROCEDURE — 99214 OFFICE O/P EST MOD 30 MIN: CPT | Mod: 25

## 2021-11-15 PROCEDURE — 58340 CATHETER FOR HYSTEROGRAPHY: CPT

## 2021-11-15 PROCEDURE — 58340 CATHETER FOR HYSTEROGRAPHY: CPT | Mod: 59

## 2021-11-19 ENCOUNTER — APPOINTMENT (OUTPATIENT)
Dept: HUMAN REPRODUCTION | Facility: CLINIC | Age: 36
End: 2021-11-19
Payer: COMMERCIAL

## 2021-11-19 PROCEDURE — 99215 OFFICE O/P EST HI 40 MIN: CPT | Mod: 95

## 2021-12-16 ENCOUNTER — APPOINTMENT (OUTPATIENT)
Dept: HUMAN REPRODUCTION | Facility: CLINIC | Age: 36
End: 2021-12-16
Payer: COMMERCIAL

## 2021-12-16 PROCEDURE — 36415 COLL VENOUS BLD VENIPUNCTURE: CPT

## 2021-12-16 PROCEDURE — 76830 TRANSVAGINAL US NON-OB: CPT

## 2021-12-16 PROCEDURE — 99214 OFFICE O/P EST MOD 30 MIN: CPT | Mod: 25

## 2021-12-23 ENCOUNTER — APPOINTMENT (OUTPATIENT)
Dept: HUMAN REPRODUCTION | Facility: CLINIC | Age: 36
End: 2021-12-23

## 2022-03-14 ENCOUNTER — APPOINTMENT (OUTPATIENT)
Dept: ANTEPARTUM | Facility: CLINIC | Age: 37
End: 2022-03-14
Payer: COMMERCIAL

## 2022-03-14 ENCOUNTER — ASOB RESULT (OUTPATIENT)
Age: 37
End: 2022-03-14

## 2022-03-14 PROCEDURE — 76813 OB US NUCHAL MEAS 1 GEST: CPT | Mod: 59

## 2022-03-14 PROCEDURE — 76801 OB US < 14 WKS SINGLE FETUS: CPT

## 2022-03-14 PROCEDURE — 76817 TRANSVAGINAL US OBSTETRIC: CPT

## 2022-04-11 ENCOUNTER — ASOB RESULT (OUTPATIENT)
Age: 37
End: 2022-04-11

## 2022-04-11 ENCOUNTER — APPOINTMENT (OUTPATIENT)
Dept: ANTEPARTUM | Facility: CLINIC | Age: 37
End: 2022-04-11
Payer: COMMERCIAL

## 2022-04-11 PROCEDURE — 76817 TRANSVAGINAL US OBSTETRIC: CPT

## 2022-04-11 PROCEDURE — 76805 OB US >/= 14 WKS SNGL FETUS: CPT

## 2022-04-27 ENCOUNTER — ASOB RESULT (OUTPATIENT)
Age: 37
End: 2022-04-27

## 2022-04-27 ENCOUNTER — APPOINTMENT (OUTPATIENT)
Dept: ANTEPARTUM | Facility: CLINIC | Age: 37
End: 2022-04-27
Payer: COMMERCIAL

## 2022-04-27 PROCEDURE — 76815 OB US LIMITED FETUS(S): CPT

## 2022-04-27 PROCEDURE — 76817 TRANSVAGINAL US OBSTETRIC: CPT

## 2022-05-10 ENCOUNTER — APPOINTMENT (OUTPATIENT)
Dept: ANTEPARTUM | Facility: CLINIC | Age: 37
End: 2022-05-10
Payer: COMMERCIAL

## 2022-05-10 ENCOUNTER — ASOB RESULT (OUTPATIENT)
Age: 37
End: 2022-05-10

## 2022-05-10 PROCEDURE — 76811 OB US DETAILED SNGL FETUS: CPT

## 2022-05-10 PROCEDURE — 76817 TRANSVAGINAL US OBSTETRIC: CPT

## 2022-05-11 ENCOUNTER — APPOINTMENT (OUTPATIENT)
Dept: ANTEPARTUM | Facility: CLINIC | Age: 37
End: 2022-05-11

## 2022-05-23 ENCOUNTER — ASOB RESULT (OUTPATIENT)
Age: 37
End: 2022-05-23

## 2022-05-23 ENCOUNTER — APPOINTMENT (OUTPATIENT)
Dept: ANTEPARTUM | Facility: CLINIC | Age: 37
End: 2022-05-23
Payer: COMMERCIAL

## 2022-05-23 PROCEDURE — 76815 OB US LIMITED FETUS(S): CPT

## 2022-05-23 PROCEDURE — 76817 TRANSVAGINAL US OBSTETRIC: CPT

## 2022-06-22 ENCOUNTER — NON-APPOINTMENT (OUTPATIENT)
Age: 37
End: 2022-06-22

## 2022-06-25 ENCOUNTER — OUTPATIENT (OUTPATIENT)
Dept: OUTPATIENT SERVICES | Facility: HOSPITAL | Age: 37
LOS: 1 days | End: 2022-06-25
Payer: COMMERCIAL

## 2022-06-25 VITALS — OXYGEN SATURATION: 100 %

## 2022-06-25 VITALS — HEART RATE: 70 BPM | OXYGEN SATURATION: 100 %

## 2022-06-25 DIAGNOSIS — O26.899 OTHER SPECIFIED PREGNANCY RELATED CONDITIONS, UNSPECIFIED TRIMESTER: ICD-10-CM

## 2022-06-25 DIAGNOSIS — Z3A.00 WEEKS OF GESTATION OF PREGNANCY NOT SPECIFIED: ICD-10-CM

## 2022-06-25 PROCEDURE — 59025 FETAL NON-STRESS TEST: CPT | Mod: 26

## 2022-06-25 PROCEDURE — 59025 FETAL NON-STRESS TEST: CPT

## 2022-06-25 PROCEDURE — G0463: CPT

## 2022-06-25 NOTE — OB PROVIDER TRIAGE NOTE - NSOBPROVIDERNOTE_OBGYN_ALL_OB_FT
36yo  @27w2d presents for evaluation for vaginal bleeding after passing a small dark red clot at home.   - No acute OB intervention  - No bleeding on exam, picture of "clot" suspicious for   - NST reactive, TAUS reassuring, TVUS w/ cervical length of 4.12  - Patient cleared for discharge   - Pt to resume routine PNC    Seen w/ Dr. Daryn Gale, PGY-3

## 2022-06-25 NOTE — OB RN TRIAGE NOTE - FALL HARM RISK - UNIVERSAL INTERVENTIONS
Bed in lowest position, wheels locked, appropriate side rails in place/Call bell, personal items and telephone in reach/Instruct patient to call for assistance before getting out of bed or chair/Non-slip footwear when patient is out of bed/[place] to call system/Physically safe environment - no spills, clutter or unnecessary equipment/Purposeful Proactive Rounding/Room/bathroom lighting operational, light cord in reach

## 2022-06-25 NOTE — OB RN TRIAGE NOTE - FALL HARM RISK - ATTEMPT OOB
PT REQUESTING AN REFILL ON B/C NUVARING / PLEASE ADVISE     Etonogestrel-Ethinyl Estradiol (NUVARING) 0.12-0.015 MG/24HR Vaginal Ring INSERT 1 RING IN THE VAGINA FOR 3 WEEKS, THEN REMOVE FOR 1 WEEK Disp: 1 each Rfl: 12 No

## 2022-06-25 NOTE — OB PROVIDER TRIAGE NOTE - NSHPPHYSICALEXAM_GEN_ALL_CORE
Gen: NAD  Abd: soft, non-tender, gravid  SVE: 0/0/-3  SSE: physiologic discharge, no bleeding  TVUS: cervical length of 4.12  TAUS: transverse, grossly normal fluid

## 2022-07-21 ENCOUNTER — OUTPATIENT (OUTPATIENT)
Dept: OUTPATIENT SERVICES | Facility: HOSPITAL | Age: 37
LOS: 1 days | End: 2022-07-21
Payer: COMMERCIAL

## 2022-07-21 VITALS — OXYGEN SATURATION: 99 % | HEART RATE: 75 BPM

## 2022-07-21 VITALS — OXYGEN SATURATION: 98 % | HEART RATE: 79 BPM

## 2022-07-21 DIAGNOSIS — Z3A.00 WEEKS OF GESTATION OF PREGNANCY NOT SPECIFIED: ICD-10-CM

## 2022-07-21 DIAGNOSIS — O26.899 OTHER SPECIFIED PREGNANCY RELATED CONDITIONS, UNSPECIFIED TRIMESTER: ICD-10-CM

## 2022-07-21 PROCEDURE — 59025 FETAL NON-STRESS TEST: CPT

## 2022-07-21 PROCEDURE — 76818 FETAL BIOPHYS PROFILE W/NST: CPT | Mod: 26,U7

## 2022-07-21 PROCEDURE — G0463: CPT

## 2022-07-21 NOTE — OB PROVIDER TRIAGE NOTE - NSHPPHYSICALEXAM_GEN_ALL_CORE
Gen: NAD, A&O x 4  Pulm: CTA b/l   Heart: RRR  Abd: soft, gravid, NT     BSUS by Dr. Shay PGY4: BPP 8/8, MVP 4.8, posterior fundal placenta, ronda breech

## 2022-07-21 NOTE — OB PROVIDER TRIAGE NOTE - NSOBPROVIDERNOTE_OBGYN_ALL_OB_FT
37 y.o  @ 31 w HARRY 22, s/p fall w/o obstetric complaints    - fetal monitoring for 4 hours   - monitor vitals    Plan of care d/w Dr. Cyrus Herron PA-C 37 y.o  @ 31 w HARRY 22, s/p fall w/o obstetric complaints    - fetal monitoring for 4 hours   - monitor vitals    Plan of care d/w Dr. Cyrus Herron PA-C    OB PA Re-Eval Note    Pt seen and evaluated after prolonged monitoring. Pt denies contractions, LOF, VB. +FM. EFM reviewed, reactive x4 hours.     Exam  VSS  EFM 135bpm, moderate variability, +accels, -decels  [place] no contractions    A/P: 38 y/o  @ 31w HARRY 22, presents after fall @ 10:30am today.   - EFM: reactive  - [place]: no contractions  - Strict return precuations given  - Pt instructed to call the office tomorrow morning to be seen early next week  - Discussed with Dr. Cyrus Huertas, PAEdelC

## 2022-07-21 NOTE — OB PROVIDER TRIAGE NOTE - HISTORY OF PRESENT ILLNESS
36 y/o  @ 31w HARRY 22, presents after fall @ 10:30am today. Patient slipped down for 8 steps of stair, landed on her left gluteal/thigh, and back of left hand. reports +FM, denies abd pain, ctx, vaginal bleeding or leakage of fluid.     - POBHx: pLTCS (20)  - PGYNhx: hx of infertility, denies hx of fibroid or ovarian cysts  - PMHx: denies   - PSHx: laparoscopic chromotubation, polypectomy (2019)  - ALL: NKDA  - SH: denies hx of depression/anxiety

## 2022-07-21 NOTE — OB RN TRIAGE NOTE - PRO INTERPRETER NEED 2
Reason for Consultation:	[] Pain		[] Goals of Care		[] Non-pain symptoms  .			[] End of life discussion		[] Other:    Patient is a 9y4m old  Female who presents with a chief complaint of Acute vomiting to rule out obstruction (02 Mar 2020 08:22).  9 year old female with mitochondrial disorder, protein c deficiency (SVC thrombus) tracheostomy (baseline HME during day and PS/PEEP overnight), G-tube with ostomy (secondary to c diff megacolon), status post renal transplant, history of cardiac arrest and anoxic brain injury, seizure disorder, admitted with abdominal pain and vomiting likely secondary to coronavirus. Hospital course has been complicated by cardiac arrest with subsequent worsening of her neurologic status, ARDS last week which is resolving, and acute kidney injury which is worsening over the last few days.    Met with mom at the bedside.  She was tearful and expressed concerns about Simi's kidney function.  We discussed Simi's course and her current neurologic status which is worse than her baseline was after the last cardiac arrest.  The possibility of a kidney biopsy was brought up and mom is very worried about that because Simi had significant bleeding after the last biopsy.  Simi had been on dialysis prior to her kidney transplant which was done in [place].  She initially was on hemodialysis but her AED's were being cleared and she was having an increase in seizures.  They then tried PD but it did not work and so she was on CVVH until she was transplanted.        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  Chronic kidney disease: from keppra  Global developmental delay  Tubulo-interstitial nephritis  Anemia  Hydronephrosis of left kidney  Seizure  Colostomy in place  Gastrostomy tube in place  Tracheostomy tube present  H/O brain surgery: 2016  H/O kidney transplant    FAMILY HISTORY:  No pertinent family history in first degree relatives    SOCIAL HISTORY:  no change  MEDICATIONS  (STANDING):  ALBUTerol  Intermittent Nebulization - Peds 2.5 milliGRAM(s) Nebulizer every 8 hours  amantadine Oral Liquid - Peds 100 milliGRAM(s) Oral every 12 hours  baclofen Oral Liquid - Peds 15 milliGRAM(s) Enteral Tube every 8 hours  buDESOnide   for Nebulization - Peds 0.5 milliGRAM(s) Nebulizer every 12 hours  cannabidiol Oral Liquid - Peds 100 milliGRAM(s) Enteral Tube <User Schedule>  cefepime  IV Intermittent - Peds 1800 milliGRAM(s) IV Intermittent every 24 hours  chlorhexidine 0.12% Oral Liquid - Peds 15 milliLiter(s) Swish and Spit two times a day  cholecalciferol Oral Liquid - Peds 1200 Unit(s) Enteral Tube <User Schedule>  cloNIDine 0.2 mG/24Hr(s) Transdermal Patch - Peds 2 Patch Transdermal <User Schedule>  doxazosin Oral Tab/Cap - Peds 0.5 milliGRAM(s) Oral daily  doxazosin Oral Tab/Cap - Peds 1 milliGRAM(s) Oral every 24 hours  epoetin mague Injection - Peds 2700 Unit(s) SubCutaneous <User Schedule>  eslicarbazepine Oral Tab/Cap - Peds 200 milliGRAM(s) Oral every 24 hours  ferrous sulfate Oral Liquid - Peds 65 milliGRAM(s) Elemental Iron Enteral Tube <User Schedule>  fludroCORTISONE Oral Tab/Cap - Peds 0.1 milliGRAM(s) Oral <User Schedule>  gabapentin Oral Liquid - Peds 300 milliGRAM(s) Oral every 12 hours  lacosamide  Oral Tab/Cap - Peds 200 milliGRAM(s) Oral two times a day  magnesium oxide Tab/Cap - Peds 400 milliGRAM(s) Oral <User Schedule>  mycophenolate mofetil  Oral Liquid - Peds 400 milliGRAM(s) Enteral Tube <User Schedule>  petrolatum, white/mineral oil Ophthalmic Ointment - Peds 1 Application(s) Both EYES two times a day  prednisoLONE  Oral Liquid - Peds 3 milliGRAM(s) Enteral Tube <User Schedule>  sodium chloride 0.9% for Nebulization - Peds 3 milliLiter(s) Nebulizer once  sodium chloride 3% for Nebulization - Peds 4 milliLiter(s) Nebulizer three times a day  tacrolimus  Oral Liquid - Peds 1.1 milliGRAM(s) Oral <User Schedule>    MEDICATIONS  (PRN):  acetaminophen   Oral Liquid - Peds. 400 milliGRAM(s) Oral every 4 hours PRN Temp greater or equal to 38 C (100.4 F), Moderate Pain (4 - 6), Severe Pain (7 - 10)  ALBUTerol  Intermittent Nebulization - Peds 2.5 milliGRAM(s) Nebulizer every 6 hours PRN Shortness of Breath and/or Wheezing  hydrALAZINE IV Intermittent - Peds 7 milliGRAM(s) IV Intermittent every 4 hours PRN BP >130/90  NIFEdipine Oral Liquid - Peds 7.5 milliGRAM(s) Oral every 4 hours PRN BP >130/90      Vital Signs Last 24 Hrs  T(C): 36.9 (02 Mar 2020 14:00), Max: 36.9 (02 Mar 2020 11:00)  T(F): 98.4 (02 Mar 2020 14:00), Max: 98.4 (02 Mar 2020 11:00)  HR: 70 (02 Mar 2020 15:41) (56 - 90)  BP: 114/53 (02 Mar 2020 14:00) (103/70 - 131/43)  BP(mean): 69 (02 Mar 2020 14:00) (61 - 85)  RR: 14 (02 Mar 2020 14:00) (12 - 17)  SpO2: 98% (02 Mar 2020 15:41) (92% - 98%)  Daily     Daily     PHYSICAL EXAM  [x ] Full exam deferred  Eyes open with no tracking or meaningful interaction    Lab Results:                        6.7    10.50 )-----------( 200      ( 02 Mar 2020 11:30 )             21.9     03-02    139  |  101  |  55<H>  ----------------------------<  164<H>  4.8   |  22  |  3.12<H>    Ca    9.7      02 Mar 2020 07:55  Phos  4.0     03-02  Mg     2.6     03-02      PT/INR - ( 02 Mar 2020 07:55 )   PT: 17.7 SEC;   INR: 1.52          PTT - ( 02 Mar 2020 07:55 )  PTT:36.6 SEC      IMAGING STUDIES:    Time spent counseling regarding:  [x] Goals of care		[] Resuscitation status		[] Prognosis		[] Hospice  [] Discharge planning	[] Symptom management	[x] Emotional support	[] Bereavement  [] Care coordination with other disciplines  [] Family meeting start time:		End time:		Total Time:  _25_ Minutes spend on total encounter: more than 50% of the visit was spent counseling and/or coordinating care  __ Minutes of critical care provided to this unstable patient with organ failure English

## 2022-08-03 ENCOUNTER — ASOB RESULT (OUTPATIENT)
Age: 37
End: 2022-08-03

## 2022-08-03 ENCOUNTER — APPOINTMENT (OUTPATIENT)
Dept: ANTEPARTUM | Facility: CLINIC | Age: 37
End: 2022-08-03

## 2022-08-03 PROCEDURE — 76816 OB US FOLLOW-UP PER FETUS: CPT

## 2022-08-18 ENCOUNTER — NON-APPOINTMENT (OUTPATIENT)
Age: 37
End: 2022-08-18

## 2022-08-31 ENCOUNTER — APPOINTMENT (OUTPATIENT)
Dept: ANTEPARTUM | Facility: CLINIC | Age: 37
End: 2022-08-31

## 2022-08-31 ENCOUNTER — ASOB RESULT (OUTPATIENT)
Age: 37
End: 2022-08-31

## 2022-08-31 PROCEDURE — 76819 FETAL BIOPHYS PROFIL W/O NST: CPT

## 2022-08-31 PROCEDURE — 76816 OB US FOLLOW-UP PER FETUS: CPT

## 2022-09-01 ENCOUNTER — OUTPATIENT (OUTPATIENT)
Dept: OUTPATIENT SERVICES | Facility: HOSPITAL | Age: 37
LOS: 1 days | End: 2022-09-01
Payer: COMMERCIAL

## 2022-09-01 VITALS
TEMPERATURE: 99 F | RESPIRATION RATE: 18 BRPM | WEIGHT: 132.94 LBS | HEART RATE: 75 BPM | HEIGHT: 61 IN | OXYGEN SATURATION: 99 % | SYSTOLIC BLOOD PRESSURE: 106 MMHG | DIASTOLIC BLOOD PRESSURE: 74 MMHG

## 2022-09-01 DIAGNOSIS — Z01.818 ENCOUNTER FOR OTHER PREPROCEDURAL EXAMINATION: ICD-10-CM

## 2022-09-01 DIAGNOSIS — O34.219 MATERNAL CARE FOR UNSPECIFIED TYPE SCAR FROM PREVIOUS CESAREAN DELIVERY: ICD-10-CM

## 2022-09-01 DIAGNOSIS — Z98.890 OTHER SPECIFIED POSTPROCEDURAL STATES: Chronic | ICD-10-CM

## 2022-09-01 DIAGNOSIS — Z34.90 ENCOUNTER FOR SUPERVISION OF NORMAL PREGNANCY, UNSPECIFIED, UNSPECIFIED TRIMESTER: ICD-10-CM

## 2022-09-01 DIAGNOSIS — Z98.891 HISTORY OF UTERINE SCAR FROM PREVIOUS SURGERY: Chronic | ICD-10-CM

## 2022-09-01 LAB
ANION GAP SERPL CALC-SCNC: 11 MMOL/L — SIGNIFICANT CHANGE UP (ref 5–17)
BLD GP AB SCN SERPL QL: NEGATIVE — SIGNIFICANT CHANGE UP
BUN SERPL-MCNC: 7 MG/DL — SIGNIFICANT CHANGE UP (ref 7–23)
CALCIUM SERPL-MCNC: 9.7 MG/DL — SIGNIFICANT CHANGE UP (ref 8.4–10.5)
CHLORIDE SERPL-SCNC: 103 MMOL/L — SIGNIFICANT CHANGE UP (ref 96–108)
CO2 SERPL-SCNC: 21 MMOL/L — LOW (ref 22–31)
CREAT SERPL-MCNC: 0.48 MG/DL — LOW (ref 0.5–1.3)
EGFR: 125 ML/MIN/1.73M2 — SIGNIFICANT CHANGE UP
GLUCOSE SERPL-MCNC: 83 MG/DL — SIGNIFICANT CHANGE UP (ref 70–99)
HCT VFR BLD CALC: 34.9 % — SIGNIFICANT CHANGE UP (ref 34.5–45)
HGB BLD-MCNC: 11.7 G/DL — SIGNIFICANT CHANGE UP (ref 11.5–15.5)
MCHC RBC-ENTMCNC: 28.9 PG — SIGNIFICANT CHANGE UP (ref 27–34)
MCHC RBC-ENTMCNC: 33.5 GM/DL — SIGNIFICANT CHANGE UP (ref 32–36)
MCV RBC AUTO: 86.2 FL — SIGNIFICANT CHANGE UP (ref 80–100)
NRBC # BLD: 0 /100 WBCS — SIGNIFICANT CHANGE UP (ref 0–0)
PLATELET # BLD AUTO: 263 K/UL — SIGNIFICANT CHANGE UP (ref 150–400)
POTASSIUM SERPL-MCNC: 4.1 MMOL/L — SIGNIFICANT CHANGE UP (ref 3.5–5.3)
POTASSIUM SERPL-SCNC: 4.1 MMOL/L — SIGNIFICANT CHANGE UP (ref 3.5–5.3)
RBC # BLD: 4.05 M/UL — SIGNIFICANT CHANGE UP (ref 3.8–5.2)
RBC # FLD: 14.6 % — HIGH (ref 10.3–14.5)
RH IG SCN BLD-IMP: POSITIVE — SIGNIFICANT CHANGE UP
SODIUM SERPL-SCNC: 135 MMOL/L — SIGNIFICANT CHANGE UP (ref 135–145)
WBC # BLD: 12.05 K/UL — HIGH (ref 3.8–10.5)
WBC # FLD AUTO: 12.05 K/UL — HIGH (ref 3.8–10.5)

## 2022-09-01 PROCEDURE — 80048 BASIC METABOLIC PNL TOTAL CA: CPT

## 2022-09-01 PROCEDURE — G0463: CPT

## 2022-09-01 PROCEDURE — 85027 COMPLETE CBC AUTOMATED: CPT

## 2022-09-01 PROCEDURE — 36415 COLL VENOUS BLD VENIPUNCTURE: CPT

## 2022-09-01 PROCEDURE — 86900 BLOOD TYPING SEROLOGIC ABO: CPT

## 2022-09-01 PROCEDURE — 86901 BLOOD TYPING SEROLOGIC RH(D): CPT

## 2022-09-01 PROCEDURE — 86850 RBC ANTIBODY SCREEN: CPT

## 2022-09-01 NOTE — OB PST NOTE - NSICDXPASTMEDICALHX_GEN_ALL_CORE_FT
PAST MEDICAL HISTORY:  Cervical polyp     Hydrosalpinx     Migraines     Missed ab     Uterine polyp

## 2022-09-01 NOTE — OB PST NOTE - HISTORY OF PRESENT ILLNESS
This is a 38 y/o female PMH migraines, cervical polyp, uterine polyp, S/P polypectomy, hydrosalpinx, S/P laparoscopy,  5, para 1 on prophylactic aspirin for previous missed AB X 3.  Presents today for repeat .    No H/O COVID infection, COVID swab scheduled 22

## 2022-09-01 NOTE — OB PST NOTE - FALL HARM RISK - UNIVERSAL INTERVENTIONS
Bed in lowest position, wheels locked, appropriate side rails in place/Call bell, personal items and telephone in reach/Instruct patient to call for assistance before getting out of bed or chair/Non-slip footwear when patient is out of bed/[place] to call system/Physically safe environment - no spills, clutter or unnecessary equipment/Purposeful Proactive Rounding/Room/bathroom lighting operational, light cord in reach

## 2022-09-06 PROBLEM — N70.11 CHRONIC SALPINGITIS: Chronic | Status: ACTIVE | Noted: 2022-09-01

## 2022-09-06 PROBLEM — N84.1 POLYP OF CERVIX UTERI: Chronic | Status: ACTIVE | Noted: 2022-09-01

## 2022-09-06 PROBLEM — O02.1 MISSED ABORTION: Chronic | Status: ACTIVE | Noted: 2022-09-01

## 2022-09-06 PROBLEM — G43.909 MIGRAINE, UNSPECIFIED, NOT INTRACTABLE, WITHOUT STATUS MIGRAINOSUS: Chronic | Status: ACTIVE | Noted: 2022-09-01

## 2022-09-14 ENCOUNTER — OUTPATIENT (OUTPATIENT)
Dept: OUTPATIENT SERVICES | Facility: HOSPITAL | Age: 37
LOS: 1 days | End: 2022-09-14
Payer: COMMERCIAL

## 2022-09-14 DIAGNOSIS — Z98.890 OTHER SPECIFIED POSTPROCEDURAL STATES: Chronic | ICD-10-CM

## 2022-09-14 DIAGNOSIS — Z98.891 HISTORY OF UTERINE SCAR FROM PREVIOUS SURGERY: Chronic | ICD-10-CM

## 2022-09-14 DIAGNOSIS — Z11.52 ENCOUNTER FOR SCREENING FOR COVID-19: ICD-10-CM

## 2022-09-14 LAB — SARS-COV-2 RNA SPEC QL NAA+PROBE: SIGNIFICANT CHANGE UP

## 2022-09-14 PROCEDURE — C9803: CPT

## 2022-09-14 PROCEDURE — U0005: CPT

## 2022-09-14 PROCEDURE — U0003: CPT

## 2022-09-15 ENCOUNTER — TRANSCRIPTION ENCOUNTER (OUTPATIENT)
Age: 37
End: 2022-09-15

## 2022-09-16 ENCOUNTER — INPATIENT (INPATIENT)
Facility: HOSPITAL | Age: 37
LOS: 1 days | Discharge: HOME | End: 2022-09-18
Attending: OBSTETRICS & GYNECOLOGY | Admitting: OBSTETRICS & GYNECOLOGY
Payer: COMMERCIAL

## 2022-09-16 VITALS — SYSTOLIC BLOOD PRESSURE: 114 MMHG | DIASTOLIC BLOOD PRESSURE: 80 MMHG | HEART RATE: 75 BPM

## 2022-09-16 DIAGNOSIS — Z98.890 OTHER SPECIFIED POSTPROCEDURAL STATES: Chronic | ICD-10-CM

## 2022-09-16 DIAGNOSIS — Z98.891 HISTORY OF UTERINE SCAR FROM PREVIOUS SURGERY: Chronic | ICD-10-CM

## 2022-09-16 DIAGNOSIS — O34.219 MATERNAL CARE FOR UNSPECIFIED TYPE SCAR FROM PREVIOUS CESAREAN DELIVERY: ICD-10-CM

## 2022-09-16 LAB
BLD GP AB SCN SERPL QL: NEGATIVE — SIGNIFICANT CHANGE UP
COVID-19 SPIKE DOMAIN AB INTERP: POSITIVE
COVID-19 SPIKE DOMAIN ANTIBODY RESULT: >250 U/ML — HIGH
RH IG SCN BLD-IMP: POSITIVE — SIGNIFICANT CHANGE UP
SARS-COV-2 IGG+IGM SERPL QL IA: >250 U/ML — HIGH
SARS-COV-2 IGG+IGM SERPL QL IA: POSITIVE
T PALLIDUM AB TITR SER: NEGATIVE — SIGNIFICANT CHANGE UP

## 2022-09-16 PROCEDURE — 59514 CESAREAN DELIVERY ONLY: CPT | Mod: AS,U9

## 2022-09-16 RX ORDER — INFLUENZA VIRUS VACCINE 15; 15; 15; 15 UG/.5ML; UG/.5ML; UG/.5ML; UG/.5ML
0.5 SUSPENSION INTRAMUSCULAR ONCE
Refills: 0 | Status: DISCONTINUED | OUTPATIENT
Start: 2022-09-16 | End: 2022-09-18

## 2022-09-16 RX ORDER — TETANUS TOXOID, REDUCED DIPHTHERIA TOXOID AND ACELLULAR PERTUSSIS VACCINE, ADSORBED 5; 2.5; 8; 8; 2.5 [IU]/.5ML; [IU]/.5ML; UG/.5ML; UG/.5ML; UG/.5ML
0.5 SUSPENSION INTRAMUSCULAR ONCE
Refills: 0 | Status: DISCONTINUED | OUTPATIENT
Start: 2022-09-16 | End: 2022-09-18

## 2022-09-16 RX ORDER — DIPHENHYDRAMINE HCL 50 MG
25 CAPSULE ORAL EVERY 6 HOURS
Refills: 0 | Status: DISCONTINUED | OUTPATIENT
Start: 2022-09-16 | End: 2022-09-18

## 2022-09-16 RX ORDER — OXYCODONE HYDROCHLORIDE 5 MG/1
5 TABLET ORAL
Refills: 0 | Status: DISCONTINUED | OUTPATIENT
Start: 2022-09-16 | End: 2022-09-17

## 2022-09-16 RX ORDER — NALOXONE HYDROCHLORIDE 4 MG/.1ML
0.1 SPRAY NASAL
Refills: 0 | Status: DISCONTINUED | OUTPATIENT
Start: 2022-09-16 | End: 2022-09-17

## 2022-09-16 RX ORDER — KETOROLAC TROMETHAMINE 30 MG/ML
30 SYRINGE (ML) INJECTION EVERY 6 HOURS
Refills: 0 | Status: DISCONTINUED | OUTPATIENT
Start: 2022-09-16 | End: 2022-09-16

## 2022-09-16 RX ORDER — KETOROLAC TROMETHAMINE 30 MG/ML
30 SYRINGE (ML) INJECTION EVERY 6 HOURS
Refills: 0 | Status: COMPLETED | OUTPATIENT
Start: 2022-09-16 | End: 2022-09-17

## 2022-09-16 RX ORDER — SODIUM CHLORIDE 9 MG/ML
1000 INJECTION, SOLUTION INTRAVENOUS
Refills: 0 | Status: DISCONTINUED | OUTPATIENT
Start: 2022-09-16 | End: 2022-09-18

## 2022-09-16 RX ORDER — LANOLIN
1 OINTMENT (GRAM) TOPICAL EVERY 6 HOURS
Refills: 0 | Status: DISCONTINUED | OUTPATIENT
Start: 2022-09-16 | End: 2022-09-16

## 2022-09-16 RX ORDER — SIMETHICONE 80 MG/1
80 TABLET, CHEWABLE ORAL EVERY 4 HOURS
Refills: 0 | Status: DISCONTINUED | OUTPATIENT
Start: 2022-09-16 | End: 2022-09-16

## 2022-09-16 RX ORDER — SODIUM CHLORIDE 9 MG/ML
1000 INJECTION, SOLUTION INTRAVENOUS
Refills: 0 | Status: DISCONTINUED | OUTPATIENT
Start: 2022-09-16 | End: 2022-09-16

## 2022-09-16 RX ORDER — OXYTOCIN 10 UNIT/ML
333.33 VIAL (ML) INJECTION
Qty: 20 | Refills: 0 | Status: DISCONTINUED | OUTPATIENT
Start: 2022-09-16 | End: 2022-09-16

## 2022-09-16 RX ORDER — HEPARIN SODIUM 5000 [USP'U]/ML
5000 INJECTION INTRAVENOUS; SUBCUTANEOUS EVERY 12 HOURS
Refills: 0 | Status: DISCONTINUED | OUTPATIENT
Start: 2022-09-16 | End: 2022-09-18

## 2022-09-16 RX ORDER — SIMETHICONE 80 MG/1
80 TABLET, CHEWABLE ORAL EVERY 4 HOURS
Refills: 0 | Status: DISCONTINUED | OUTPATIENT
Start: 2022-09-16 | End: 2022-09-18

## 2022-09-16 RX ORDER — OXYTOCIN 10 UNIT/ML
333.33 VIAL (ML) INJECTION
Qty: 20 | Refills: 0 | Status: DISCONTINUED | OUTPATIENT
Start: 2022-09-16 | End: 2022-09-18

## 2022-09-16 RX ORDER — NALBUPHINE HYDROCHLORIDE 10 MG/ML
2.5 INJECTION, SOLUTION INTRAMUSCULAR; INTRAVENOUS; SUBCUTANEOUS EVERY 6 HOURS
Refills: 0 | Status: DISCONTINUED | OUTPATIENT
Start: 2022-09-16 | End: 2022-09-17

## 2022-09-16 RX ORDER — OXYCODONE HYDROCHLORIDE 5 MG/1
5 TABLET ORAL
Refills: 0 | Status: DISCONTINUED | OUTPATIENT
Start: 2022-09-16 | End: 2022-09-18

## 2022-09-16 RX ORDER — MAGNESIUM HYDROXIDE 400 MG/1
30 TABLET, CHEWABLE ORAL
Refills: 0 | Status: DISCONTINUED | OUTPATIENT
Start: 2022-09-16 | End: 2022-09-18

## 2022-09-16 RX ORDER — MORPHINE SULFATE 50 MG/1
0.1 CAPSULE, EXTENDED RELEASE ORAL ONCE
Refills: 0 | Status: DISCONTINUED | OUTPATIENT
Start: 2022-09-16 | End: 2022-09-17

## 2022-09-16 RX ORDER — CEFAZOLIN SODIUM 1 G
2000 VIAL (EA) INJECTION ONCE
Refills: 0 | Status: COMPLETED | OUTPATIENT
Start: 2022-09-16 | End: 2022-09-16

## 2022-09-16 RX ORDER — CITRIC ACID/SODIUM CITRATE 300-500 MG
15 SOLUTION, ORAL ORAL ONCE
Refills: 0 | Status: COMPLETED | OUTPATIENT
Start: 2022-09-16 | End: 2022-09-16

## 2022-09-16 RX ORDER — ACETAMINOPHEN 500 MG
975 TABLET ORAL
Refills: 0 | Status: DISCONTINUED | OUTPATIENT
Start: 2022-09-16 | End: 2022-09-18

## 2022-09-16 RX ORDER — OXYCODONE HYDROCHLORIDE 5 MG/1
5 TABLET ORAL ONCE
Refills: 0 | Status: DISCONTINUED | OUTPATIENT
Start: 2022-09-16 | End: 2022-09-18

## 2022-09-16 RX ORDER — ACETAMINOPHEN 500 MG
975 TABLET ORAL
Refills: 0 | Status: DISCONTINUED | OUTPATIENT
Start: 2022-09-16 | End: 2022-09-16

## 2022-09-16 RX ORDER — ONDANSETRON 8 MG/1
4 TABLET, FILM COATED ORAL EVERY 6 HOURS
Refills: 0 | Status: DISCONTINUED | OUTPATIENT
Start: 2022-09-16 | End: 2022-09-17

## 2022-09-16 RX ORDER — CHLORHEXIDINE GLUCONATE 213 G/1000ML
1 SOLUTION TOPICAL ONCE
Refills: 0 | Status: DISCONTINUED | OUTPATIENT
Start: 2022-09-16 | End: 2022-09-16

## 2022-09-16 RX ORDER — FAMOTIDINE 10 MG/ML
20 INJECTION INTRAVENOUS ONCE
Refills: 0 | Status: COMPLETED | OUTPATIENT
Start: 2022-09-16 | End: 2022-09-16

## 2022-09-16 RX ORDER — IBUPROFEN 200 MG
600 TABLET ORAL EVERY 6 HOURS
Refills: 0 | Status: DISCONTINUED | OUTPATIENT
Start: 2022-09-16 | End: 2022-09-18

## 2022-09-16 RX ORDER — IBUPROFEN 200 MG
600 TABLET ORAL EVERY 6 HOURS
Refills: 0 | Status: COMPLETED | OUTPATIENT
Start: 2022-09-16 | End: 2023-08-15

## 2022-09-16 RX ORDER — LANOLIN
1 OINTMENT (GRAM) TOPICAL EVERY 6 HOURS
Refills: 0 | Status: DISCONTINUED | OUTPATIENT
Start: 2022-09-16 | End: 2022-09-18

## 2022-09-16 RX ORDER — MAGNESIUM HYDROXIDE 400 MG/1
30 TABLET, CHEWABLE ORAL
Refills: 0 | Status: DISCONTINUED | OUTPATIENT
Start: 2022-09-16 | End: 2022-09-16

## 2022-09-16 RX ORDER — SODIUM CHLORIDE 9 MG/ML
1000 INJECTION, SOLUTION INTRAVENOUS ONCE
Refills: 0 | Status: DISCONTINUED | OUTPATIENT
Start: 2022-09-16 | End: 2022-09-16

## 2022-09-16 RX ORDER — DEXAMETHASONE 0.5 MG/5ML
4 ELIXIR ORAL EVERY 6 HOURS
Refills: 0 | Status: DISCONTINUED | OUTPATIENT
Start: 2022-09-16 | End: 2022-09-17

## 2022-09-16 RX ADMIN — Medication 975 MILLIGRAM(S): at 21:20

## 2022-09-16 RX ADMIN — Medication 100 MILLIGRAM(S): at 09:20

## 2022-09-16 RX ADMIN — Medication 1000 MILLIUNIT(S)/MIN: at 10:45

## 2022-09-16 RX ADMIN — FAMOTIDINE 20 MILLIGRAM(S): 10 INJECTION INTRAVENOUS at 08:52

## 2022-09-16 RX ADMIN — Medication 30 MILLIGRAM(S): at 18:03

## 2022-09-16 RX ADMIN — ONDANSETRON 4 MILLIGRAM(S): 8 TABLET, FILM COATED ORAL at 19:56

## 2022-09-16 RX ADMIN — Medication 15 MILLILITER(S): at 08:52

## 2022-09-16 RX ADMIN — HEPARIN SODIUM 5000 UNIT(S): 5000 INJECTION INTRAVENOUS; SUBCUTANEOUS at 17:36

## 2022-09-16 RX ADMIN — SODIUM CHLORIDE 125 MILLILITER(S): 9 INJECTION, SOLUTION INTRAVENOUS at 18:42

## 2022-09-16 RX ADMIN — Medication 975 MILLIGRAM(S): at 21:50

## 2022-09-16 RX ADMIN — Medication 30 MILLIGRAM(S): at 17:43

## 2022-09-16 NOTE — OB RN INTRAOPERATIVE NOTE - NSSELHIDDEN_OBGYN_ALL_OB_FT
[NS_DeliveryAttending2_OBGYN_ALL_OB_FT:[id number]==],[NS_DeliveryRN_OBGYN_ALL_OB_FT:[id number]]

## 2022-09-16 NOTE — OB PROVIDER H&P - ATTENDING COMMENTS
Pt seen and examined.  Agree with note above  Pt for repeat c/section, declines tolac  consents signed  all questions answered.  PLEE

## 2022-09-16 NOTE — OB RN PATIENT PROFILE - FALL HARM RISK - UNIVERSAL INTERVENTIONS
Bed in lowest position, wheels locked, appropriate side rails in place/Call bell, personal items and telephone in reach/Instruct patient to call for assistance before getting out of bed or chair/Non-slip footwear when patient is out of bed/[place] to call system/Physically safe environment - no spills, clutter or unnecessary equipment/Purposeful Proactive Rounding/Room/bathroom lighting operational, light cord in reach

## 2022-09-16 NOTE — OB PROVIDER DELIVERY SUMMARY - NSPROVIDERDELIVERYNOTE_OBGYN_ALL_OB_FT
Atraumatic delivery of viable female infant in vertex position. Apgars 9/9. Clear amniotic fluid.   Manual removal of intact placenta. Grossly normal uterus, tubes and ovaries b/l.   EBL 600cc  UO 450cc  IVF 2000cc  dic # [id number] Atraumatic delivery of viable female infant in vertex position. Apgars 9/9. Clear amniotic fluid.   Manual removal of intact placenta. Grossly normal uterus, tubes and ovaries b/l.   Minimal adhesions noted  EBL 600cc  UO 450cc  IVF 2000cc  dic # [id number]

## 2022-09-16 NOTE — OB PROVIDER H&P - HISTORY OF PRESENT ILLNESS
R1 H&P    Pt is a 38y/o  at 39w1d admitted for rLTCS for prior C/S. +FM, -VB, -LOF, -ctx  Prenatal course uncomplicated. She was seen in triage at 26wk for abnormal discharge, which was determined the be part of the polyp. She was also seen in triage at 30wks for a short fall onto her bottom, and was monitored for 4 hours and discharged with no issues.   GBS negative  EFW 3750    OBHx:   - pLTCS  for arrest of descent, c/b staph infection weeks later and 5 day admission  - chemical pregnancy x3  GynHx: denies h/o abnormal paps, STI's, fibroids, cysts  PMHx: migraines  PSHx: laparoscopic chromotubation, uterine polypectomy 2019  Med: PNV, ASA, Fe  All: NKDA  SH: denies alcohol, tobacco, or drug use  Psych: denies h/o anxiety or depression

## 2022-09-16 NOTE — OB PROVIDER H&P - NSHPPHYSICALEXAM_GEN_ALL_CORE
19-Oct-2018 10:12
EFH: 145/mod/+accels no decels  D[place]ac: q8min ctx  VE: deferred  TAUS: vertex    General: comfortable, NAD  Abd: Parous, soft, nontender

## 2022-09-16 NOTE — OB PROVIDER DELIVERY SUMMARY - NSOBVTERISKREFER_OBGYN_ALL_OB
Refer to the Assessment tab to view/cancel completed assessment.
no deformity, pain or tenderness. no restriction of movement

## 2022-09-16 NOTE — OB PROVIDER H&P - NS_OBGYNHISTORY_OBGYN_ALL_OB_FT
- pLT  for arrest of descent  - chemical pregnancy x3    +cervical polyp  +endometrial polyp, S/P polypectomy  hydrosalpinx

## 2022-09-16 NOTE — OB RN DELIVERY SUMMARY - NSSELHIDDEN_OBGYN_ALL_OB_FT
[NS_DeliveryAttending2_OBGYN_ALL_OB_FT:[id number]==],[NS_DeliveryRN_OBGYN_ALL_OB_FT:[id number]==]

## 2022-09-16 NOTE — OB PROVIDER H&P - ASSESSMENT
A&P:   Labor: admit to L&D for rLTCS  - pepcid/bicitra  - routine labs  - EFM/toco  - NPO, IV hydration  - COVID-19 swab  Fetal: cat 1 tracing, fetal status reassuring  GBS: neg  Analgesia: spinal anesthesia for section      D/w Dr. Daryn Sue PGY-1

## 2022-09-16 NOTE — OB PROVIDER DELIVERY SUMMARY - NSSELHIDDEN_OBGYN_ALL_OB_FT
[NS_DeliveryAttending2_OBGYN_ALL_OB_FT:[id number]==],[NS_DeliveryRN_OBGYN_ALL_OB_FT:[id number]==],[NS_DeliveryAttending1_OBGYN_ALL_OB_FT:[id number]],[NS_DeliveryAssist1_OBGYN_ALL_OB_FT:[id number]]

## 2022-09-16 NOTE — OB RN PATIENT PROFILE - FUNCTIONAL ASSESSMENT - BASIC MOBILITY 6.
4-calculated by average/Not able to assess (calculate score using [facility] averaging method)

## 2022-09-16 NOTE — PRE-ANESTHESIA EVALUATION ADULT - NSANTHPMHFT_GEN_ALL_CORE
36 y/o female  PMH migraines, cervical polyp, uterine polyp, S/P polypectomy, hydrosalpinx S/P laparoscopy on prophylactic aspirin for previous missed AB X 3 presents today for repeat .

## 2022-09-16 NOTE — OB RN PATIENT PROFILE - NSICDXNOFAMILYHX_GEN_ALL_CORE
Taylor Bryan attended 3 hours of group on 11/15/2019.     The group topic was Stress Management, patient was responsive to topic.     Patient's engagement in the group session: low     Total number of patients present 8.     Supervising MD: Dr. Sushma Roman  11/15/2019, 3:34 PM  
<-- Click to add NO pertinent Family History

## 2022-09-17 ENCOUNTER — TRANSCRIPTION ENCOUNTER (OUTPATIENT)
Age: 37
End: 2022-09-17

## 2022-09-17 LAB
BASOPHILS # BLD AUTO: 0.04 K/UL — SIGNIFICANT CHANGE UP (ref 0–0.2)
BASOPHILS NFR BLD AUTO: 0.3 % — SIGNIFICANT CHANGE UP (ref 0–2)
EOSINOPHIL # BLD AUTO: 0.1 K/UL — SIGNIFICANT CHANGE UP (ref 0–0.5)
EOSINOPHIL NFR BLD AUTO: 0.7 % — SIGNIFICANT CHANGE UP (ref 0–6)
HCT VFR BLD CALC: 27.8 % — LOW (ref 34.5–45)
HGB BLD-MCNC: 9.5 G/DL — LOW (ref 11.5–15.5)
IMM GRANULOCYTES NFR BLD AUTO: 0.6 % — SIGNIFICANT CHANGE UP (ref 0–0.9)
LYMPHOCYTES # BLD AUTO: 19.1 % — SIGNIFICANT CHANGE UP (ref 13–44)
LYMPHOCYTES # BLD AUTO: 2.71 K/UL — SIGNIFICANT CHANGE UP (ref 1–3.3)
MCHC RBC-ENTMCNC: 30 PG — SIGNIFICANT CHANGE UP (ref 27–34)
MCHC RBC-ENTMCNC: 34.2 GM/DL — SIGNIFICANT CHANGE UP (ref 32–36)
MCV RBC AUTO: 87.7 FL — SIGNIFICANT CHANGE UP (ref 80–100)
MONOCYTES # BLD AUTO: 1.35 K/UL — HIGH (ref 0–0.9)
MONOCYTES NFR BLD AUTO: 9.5 % — SIGNIFICANT CHANGE UP (ref 2–14)
NEUTROPHILS # BLD AUTO: 9.88 K/UL — HIGH (ref 1.8–7.4)
NEUTROPHILS NFR BLD AUTO: 69.8 % — SIGNIFICANT CHANGE UP (ref 43–77)
NRBC # BLD: 0 /100 WBCS — SIGNIFICANT CHANGE UP (ref 0–0)
PLATELET # BLD AUTO: 182 K/UL — SIGNIFICANT CHANGE UP (ref 150–400)
RBC # BLD: 3.17 M/UL — LOW (ref 3.8–5.2)
RBC # FLD: 14.6 % — HIGH (ref 10.3–14.5)
WBC # BLD: 14.16 K/UL — HIGH (ref 3.8–10.5)
WBC # FLD AUTO: 14.16 K/UL — HIGH (ref 3.8–10.5)

## 2022-09-17 RX ORDER — SIMETHICONE 80 MG/1
1 TABLET, CHEWABLE ORAL
Qty: 0 | Refills: 0 | DISCHARGE
Start: 2022-09-17

## 2022-09-17 RX ORDER — IBUPROFEN 200 MG
1 TABLET ORAL
Qty: 0 | Refills: 0 | DISCHARGE
Start: 2022-09-17

## 2022-09-17 RX ORDER — ACETAMINOPHEN 500 MG
3 TABLET ORAL
Qty: 0 | Refills: 0 | DISCHARGE
Start: 2022-09-17

## 2022-09-17 RX ORDER — ASPIRIN/CALCIUM CARB/MAGNESIUM 324 MG
0 TABLET ORAL
Qty: 0 | Refills: 0 | DISCHARGE

## 2022-09-17 RX ORDER — IBUPROFEN 200 MG
600 TABLET ORAL EVERY 6 HOURS
Refills: 0 | Status: DISCONTINUED | OUTPATIENT
Start: 2022-09-17 | End: 2022-09-18

## 2022-09-17 RX ADMIN — SIMETHICONE 80 MILLIGRAM(S): 80 TABLET, CHEWABLE ORAL at 21:10

## 2022-09-17 RX ADMIN — Medication 975 MILLIGRAM(S): at 03:18

## 2022-09-17 RX ADMIN — Medication 600 MILLIGRAM(S): at 23:47

## 2022-09-17 RX ADMIN — Medication 600 MILLIGRAM(S): at 13:00

## 2022-09-17 RX ADMIN — Medication 975 MILLIGRAM(S): at 16:30

## 2022-09-17 RX ADMIN — Medication 975 MILLIGRAM(S): at 21:10

## 2022-09-17 RX ADMIN — Medication 30 MILLIGRAM(S): at 00:32

## 2022-09-17 RX ADMIN — Medication 975 MILLIGRAM(S): at 10:32

## 2022-09-17 RX ADMIN — HEPARIN SODIUM 5000 UNIT(S): 5000 INJECTION INTRAVENOUS; SUBCUTANEOUS at 05:26

## 2022-09-17 RX ADMIN — Medication 975 MILLIGRAM(S): at 15:57

## 2022-09-17 RX ADMIN — MAGNESIUM HYDROXIDE 30 MILLILITER(S): 400 TABLET, CHEWABLE ORAL at 16:06

## 2022-09-17 RX ADMIN — Medication 600 MILLIGRAM(S): at 12:23

## 2022-09-17 RX ADMIN — Medication 30 MILLIGRAM(S): at 01:02

## 2022-09-17 RX ADMIN — Medication 30 MILLIGRAM(S): at 05:26

## 2022-09-17 RX ADMIN — Medication 975 MILLIGRAM(S): at 11:00

## 2022-09-17 RX ADMIN — Medication 975 MILLIGRAM(S): at 22:00

## 2022-09-17 RX ADMIN — Medication 975 MILLIGRAM(S): at 03:48

## 2022-09-17 RX ADMIN — Medication 30 MILLIGRAM(S): at 05:56

## 2022-09-17 NOTE — DISCHARGE NOTE OB - PATIENT PORTAL LINK FT
You can access the FollowMyHealth Patient Portal offered by [facility] by registering at the following website: [email or web address]. By joining [facility]’s FollowMyHealth portal, you will also be able to view your health information using other applications (apps) compatible with our system.

## 2022-09-17 NOTE — PROGRESS NOTE ADULT - ASSESSMENT
A/P: 38yo POD#1 s/p scheduled rLTCS.  Patient is stable and doing well post-operatively.     #PP  - Pain well controlled, continue Motrin, Tylenol, and Oxycodone PRN for pain  - Increase ambulation, SCDs when not ambulating  - Continue regular diet  - d/c IVF, [place]  - Remove incision dressing  - f/u AM CBC    Christiane Barton PGY1   A/P: 36yo POD#1 s/p scheduled rLTCS.  Patient is stable and doing well post-operatively.     #PP  - Pain well controlled, continue Motrin, Tylenol, and Oxycodone PRN for pain  - Increase ambulation, SCDs when not ambulating  - Continue regular diet  - Hct 27.8    Christiane Barton PGY1

## 2022-09-17 NOTE — DISCHARGE NOTE OB - ADDITIONAL INSTRUCTIONS
call for telemed appt in 1 weeks with Dr. Stearns and again for in person postpartum appointment in 5 weeks.

## 2022-09-17 NOTE — DISCHARGE NOTE OB - CARE PROVIDER_API CALL
Jigna Stearns)  Obstetrics and Gynecology  [address] [place] [address], [place] [address]  Phone: [phone]  Fax: [phone]  Follow Up Time:

## 2022-09-17 NOTE — DISCHARGE NOTE OB - CARE PLAN
1 Principal Discharge DX:	Single delivery by  section  Assessment and plan of treatment:	see below

## 2022-09-17 NOTE — DISCHARGE NOTE OB - MEDICATION SUMMARY - MEDICATIONS TO TAKE
I will START or STAY ON the medications listed below when I get home from the hospital:    acetaminophen 325 mg oral tablet  -- 3 tab(s) by mouth every 8 hours  -- Indication: For for pain and fever    ibuprofen 600 mg oral tablet  -- 1 tab(s) by mouth every 6 hours  -- Indication: For for pain and cramping    simethicone 80 mg oral tablet, chewable  -- 1 tab(s) by mouth every 4 hours, As needed, Gas  -- Indication: For for gas pain

## 2022-09-17 NOTE — PROGRESS NOTE ADULT - SUBJECTIVE AND OBJECTIVE BOX
OB Progress Note:  Delivery, POD#1    S: 36yo POD#1 s/p LTCS. Patient feels well. Pain is well controlled. She is tolerating a regular diet and passing flatus. She is voiding spontaneously and ambulating without difficulty. Endorses light vaginal bleeding, soaking < 1 pad/hour. Denies CP/SOB. Denies lightheadedness/dizziness. Denies N/V.    MEDICATIONS  (STANDING):  acetaminophen     Tablet .. 975 milliGRAM(s) Oral <User Schedule>  diphtheria/tetanus/pertussis (acellular) Vaccine (ADAcel) 0.5 milliLiter(s) IntraMuscular once  diphtheria/tetanus/pertussis (acellular) Vaccine (ADAcel) 0.5 milliLiter(s) IntraMuscular once  heparin   Injectable 5000 Unit(s) SubCutaneous every 12 hours  ibuprofen  Tablet. 600 milliGRAM(s) Oral every 6 hours  ibuprofen  Tablet. 600 milliGRAM(s) Oral every 6 hours  influenza   Vaccine 0.5 milliLiter(s) IntraMuscular once  ketorolac   Injectable 30 milliGRAM(s) IV Push every 6 hours  lactated ringers. 1000 milliLiter(s) (125 mL/Hr) IV Continuous <Continuous>  morphine PF Spinal 0.1 milliGRAM(s) IntraThecal. once  oxytocin Infusion 333.333 milliUNIT(s)/Min (1000 mL/Hr) IV Continuous <Continuous>      MEDICATIONS  (PRN):  dexAMETHasone  Injectable 4 milliGRAM(s) IV Push every 6 hours PRN Nausea  diphenhydrAMINE 25 milliGRAM(s) Oral every 6 hours PRN Pruritus  diphenhydrAMINE 25 milliGRAM(s) Oral every 6 hours PRN Pruritus  lanolin Ointment 1 Application(s) Topical every 6 hours PRN Sore Nipples  magnesium hydroxide Suspension 30 milliLiter(s) Oral two times a day PRN Constipation  nalbuphine Injectable 2.5 milliGRAM(s) IV Push every 6 hours PRN Pruritus  naloxone Injectable 0.1 milliGRAM(s) IV Push every 3 minutes PRN For ANY of the following changes in patient status:  A. Breaths Per Minute LESS THAN 10, B. Oxygen saturation LESS THAN 90%, C. Sedation score of 6 for Stop After: 4 Times  ondansetron Injectable 4 milliGRAM(s) IV Push every 6 hours PRN Nausea  oxyCODONE    IR 5 milliGRAM(s) Oral every 3 hours PRN Mild Pain (1 - 3)  oxyCODONE    IR 5 milliGRAM(s) Oral every 3 hours PRN Moderate to Severe Pain (4-10)  oxyCODONE    IR 5 milliGRAM(s) Oral once PRN Moderate to Severe Pain (4-10)  oxyCODONE    IR 5 milliGRAM(s) Oral every 3 hours PRN Moderate to Severe Pain (4-10)  oxyCODONE    IR 5 milliGRAM(s) Oral once PRN Moderate to Severe Pain (4-10)  simethicone 80 milliGRAM(s) Chew every 4 hours PRN Gas      O:  Vitals:  Vital Signs Last 24 Hrs  T(C): 36.5 (17 Sep 2022 01:16), Max: 37.2 (16 Sep 2022 15:40)  T(F): 97.7 (17 Sep 2022 01:16), Max: 99 (16 Sep 2022 15:40)  HR: 67 (17 Sep 2022 01:16) (48 - 84)  BP: 100/63 (17 Sep 2022 01:16) (100/63 - 141/59)  BP(mean): 102 (16 Sep 2022 12:45) (79 - 102)  RR: 18 (17 Sep 2022 01:16) (14 - 29)  SpO2: 97% (17 Sep 2022 01:16) (97% - 100%)    Parameters below as of 17 Sep 2022 01:16  Patient On (Oxygen Delivery Method): room air        Labs:  Blood type: A Positive  Rubella IgG: RPR: Negative      Physical Exam:  General: NAD  Heart: Clinically well-perfused  Lungs: Breathing comfortably on room air  Abdomen: Soft, non-distended, appropriately tender, fundus firm, incision c/d/i  Vaginal: Lochia wnl  Extremities: No calf erythema/edema/tenderness

## 2022-09-17 NOTE — PROGRESS NOTE ADULT - SUBJECTIVE AND OBJECTIVE BOX
Day 1 of Anesthesia Pain Management Service    SUBJECTIVE:  Pain Scale Score:          [X] Refer to charted pain scores    THERAPY:    s/p _.1__mg PF morphine on _9/16__      MEDICATIONS  (STANDING):  acetaminophen     Tablet .. 975 milliGRAM(s) Oral <User Schedule>  diphtheria/tetanus/pertussis (acellular) Vaccine (ADAcel) 0.5 milliLiter(s) IntraMuscular once  diphtheria/tetanus/pertussis (acellular) Vaccine (ADAcel) 0.5 milliLiter(s) IntraMuscular once  heparin   Injectable 5000 Unit(s) SubCutaneous every 12 hours  ibuprofen  Tablet. 600 milliGRAM(s) Oral every 6 hours  ibuprofen  Tablet. 600 milliGRAM(s) Oral every 6 hours  influenza   Vaccine 0.5 milliLiter(s) IntraMuscular once  lactated ringers. 1000 milliLiter(s) (125 mL/Hr) IV Continuous <Continuous>  oxytocin Infusion 333.333 milliUNIT(s)/Min (1000 mL/Hr) IV Continuous <Continuous>    MEDICATIONS  (PRN):  diphenhydrAMINE 25 milliGRAM(s) Oral every 6 hours PRN Pruritus  diphenhydrAMINE 25 milliGRAM(s) Oral every 6 hours PRN Pruritus  lanolin Ointment 1 Application(s) Topical every 6 hours PRN Sore Nipples  magnesium hydroxide Suspension 30 milliLiter(s) Oral two times a day PRN Constipation  oxyCODONE    IR 5 milliGRAM(s) Oral every 3 hours PRN Moderate to Severe Pain (4-10)  oxyCODONE    IR 5 milliGRAM(s) Oral once PRN Moderate to Severe Pain (4-10)  oxyCODONE    IR 5 milliGRAM(s) Oral every 3 hours PRN Moderate to Severe Pain (4-10)  oxyCODONE    IR 5 milliGRAM(s) Oral once PRN Moderate to Severe Pain (4-10)  simethicone 80 milliGRAM(s) Chew every 4 hours PRN Gas      OBJECTIVE:    Sedation:        	[X] Alert	[ ] Drowsy	[ ] Arousable      [ ] Asleep       [ ] Unresponsive    Side Effects:	[X] None	[ ] Nausea	[ ] Vomiting         [ ] Pruritus  		[ ] Weakness            [ ] Numbness	          [ ] Other:    Vital Signs Last 24 Hrs  T(C): 37.5 (17 Sep 2022 11:59), Max: 37.5 (17 Sep 2022 11:59)  T(F): 99.5 (17 Sep 2022 11:59), Max: 99.5 (17 Sep 2022 11:59)  HR: 70 (17 Sep 2022 11:59) (50 - 72)  BP: 104/67 (17 Sep 2022 11:59) (94/61 - 132/83)  BP(mean): 102 (16 Sep 2022 12:45) (79 - 102)  RR: 18 (17 Sep 2022 11:59) (18 - 24)  SpO2: 97% (17 Sep 2022 11:59) (97% - 99%)    Parameters below as of 17 Sep 2022 11:59  Patient On (Oxygen Delivery Method): room air        ASSESSMENT/ PLAN  [X] Patient transitioned to prn analgesics  [X] Pain management per primary service, pain service to sign off   [X]Documentation and Verification of current medications

## 2022-09-17 NOTE — DISCHARGE NOTE OB - HOSPITAL COURSE
Pt underwent a C/S without any complications. Her postpartum course was uneventful. She met all her milestones in regards to her vitals, postpartum labs, diet, ambulation, pain management. Follow up discussed. Pt was discharged home on POD#   Pt underwent a C/S without any complications. Her postpartum course was uneventful. She met all her milestones in regards to her vitals, postpartum labs, diet, ambulation, pain management. Follow up discussed. Pt was discharged home on POD#2.

## 2022-09-18 VITALS
RESPIRATION RATE: 18 BRPM | OXYGEN SATURATION: 98 % | TEMPERATURE: 98 F | SYSTOLIC BLOOD PRESSURE: 106 MMHG | HEART RATE: 65 BPM | DIASTOLIC BLOOD PRESSURE: 65 MMHG

## 2022-09-18 PROCEDURE — 86769 SARS-COV-2 COVID-19 ANTIBODY: CPT

## 2022-09-18 PROCEDURE — 59025 FETAL NON-STRESS TEST: CPT

## 2022-09-18 PROCEDURE — 85025 COMPLETE CBC W/AUTO DIFF WBC: CPT

## 2022-09-18 PROCEDURE — 86900 BLOOD TYPING SEROLOGIC ABO: CPT

## 2022-09-18 PROCEDURE — 36415 COLL VENOUS BLD VENIPUNCTURE: CPT

## 2022-09-18 PROCEDURE — 86901 BLOOD TYPING SEROLOGIC RH(D): CPT

## 2022-09-18 PROCEDURE — 86780 TREPONEMA PALLIDUM: CPT

## 2022-09-18 PROCEDURE — 86850 RBC ANTIBODY SCREEN: CPT

## 2022-09-18 PROCEDURE — 59050 FETAL MONITOR W/REPORT: CPT

## 2022-09-18 RX ADMIN — Medication 600 MILLIGRAM(S): at 06:20

## 2022-09-18 RX ADMIN — Medication 600 MILLIGRAM(S): at 00:30

## 2022-09-18 RX ADMIN — Medication 975 MILLIGRAM(S): at 02:25

## 2022-09-18 RX ADMIN — HEPARIN SODIUM 5000 UNIT(S): 5000 INJECTION INTRAVENOUS; SUBCUTANEOUS at 05:32

## 2022-09-18 RX ADMIN — Medication 975 MILLIGRAM(S): at 09:09

## 2022-09-18 RX ADMIN — Medication 975 MILLIGRAM(S): at 09:30

## 2022-09-18 RX ADMIN — Medication 600 MILLIGRAM(S): at 11:58

## 2022-09-18 RX ADMIN — MAGNESIUM HYDROXIDE 30 MILLILITER(S): 400 TABLET, CHEWABLE ORAL at 05:37

## 2022-09-18 RX ADMIN — Medication 600 MILLIGRAM(S): at 12:30

## 2022-09-18 RX ADMIN — Medication 600 MILLIGRAM(S): at 05:32

## 2022-09-18 RX ADMIN — Medication 975 MILLIGRAM(S): at 03:15

## 2022-09-18 NOTE — PROGRESS NOTE ADULT - ATTENDING COMMENTS
I personally saw and evaluated patient and agree with Dr. Fried' assessment and plan.  Pt feels well on POD 2 after repeat  section.  Her pain is well controlled and she is ambulating, voiding and tolerating regular diet.  Her incision is c/d/i  Postpartum follow up discussed with pt.  Continue routine PO care  Discharge planning.    SELVIN Ponce
I personally saw and evaluated patient and agree with above assessment and plan.  Pt is feeling well on POD 1 after scheduled repeat  section  Her pain is well controlled and her incision is c/d/i  Will continue routine PO care    SELVIN Ponce

## 2022-09-18 NOTE — PROGRESS NOTE ADULT - SUBJECTIVE AND OBJECTIVE BOX
OB Progress Note: LTCS, POD#2    S: 36yo POD#2 s/p LTCS. Pain is well controlled. She is tolerating a regular diet and passing flatus. She is voiding spontaneously, and ambulating without difficulty. Denies CP/SOB. Denies lightheadedness/dizziness. Denies N/V.    O:  Vitals:  Vital Signs Last 24 Hrs  T(C): 36.9 (18 Sep 2022 05:28), Max: 37.5 (17 Sep 2022 11:59)  T(F): 98.4 (18 Sep 2022 05:28), Max: 99.5 (17 Sep 2022 11:59)  HR: 65 (18 Sep 2022 05:28) (65 - 72)  BP: 106/65 (18 Sep 2022 05:28) (104/67 - 110/71)  BP(mean): --  RR: 18 (18 Sep 2022 05:28) (18 - 18)  SpO2: 98% (18 Sep 2022 05:28) (97% - 98%)    Parameters below as of 18 Sep 2022 05:28  Patient On (Oxygen Delivery Method): room air        MEDICATIONS  (STANDING):  acetaminophen     Tablet .. 975 milliGRAM(s) Oral <User Schedule>  diphtheria/tetanus/pertussis (acellular) Vaccine (ADAcel) 0.5 milliLiter(s) IntraMuscular once  diphtheria/tetanus/pertussis (acellular) Vaccine (ADAcel) 0.5 milliLiter(s) IntraMuscular once  heparin   Injectable 5000 Unit(s) SubCutaneous every 12 hours  ibuprofen  Tablet. 600 milliGRAM(s) Oral every 6 hours  ibuprofen  Tablet. 600 milliGRAM(s) Oral every 6 hours  influenza   Vaccine 0.5 milliLiter(s) IntraMuscular once  lactated ringers. 1000 milliLiter(s) (125 mL/Hr) IV Continuous <Continuous>  oxytocin Infusion 333.333 milliUNIT(s)/Min (1000 mL/Hr) IV Continuous <Continuous>      MEDICATIONS  (PRN):  diphenhydrAMINE 25 milliGRAM(s) Oral every 6 hours PRN Pruritus  diphenhydrAMINE 25 milliGRAM(s) Oral every 6 hours PRN Pruritus  lanolin Ointment 1 Application(s) Topical every 6 hours PRN Sore Nipples  magnesium hydroxide Suspension 30 milliLiter(s) Oral two times a day PRN Constipation  oxyCODONE    IR 5 milliGRAM(s) Oral every 3 hours PRN Moderate to Severe Pain (4-10)  oxyCODONE    IR 5 milliGRAM(s) Oral once PRN Moderate to Severe Pain (4-10)  oxyCODONE    IR 5 milliGRAM(s) Oral every 3 hours PRN Moderate to Severe Pain (4-10)  oxyCODONE    IR 5 milliGRAM(s) Oral once PRN Moderate to Severe Pain (4-10)  simethicone 80 milliGRAM(s) Chew every 4 hours PRN Gas      Labs:  Blood type: A Positive  Rubella IgG: RPR: Negative                          9.5<L>   14.16<H> >-----------< 182    ( 09-17 @ 06:33 )             27.8<L>                  PE:  General: NAD  Abdomen: Soft, appropriately tender, incision c/d/i.  Extremities: No erythema, no pitting edema

## 2022-09-18 NOTE — PROGRESS NOTE ADULT - ASSESSMENT
A/P: 38yo POD#2 s/p LTCS.  Patient is stable and doing well post-operatively.    - Continue regular diet.  - Increase ambulation.  - Continue motrin, tylenol, oxycodone PRN for pain control.     Yonas Fried PGY1

## 2022-10-03 ENCOUNTER — NON-APPOINTMENT (OUTPATIENT)
Age: 37
End: 2022-10-03

## 2022-10-12 ENCOUNTER — NON-APPOINTMENT (OUTPATIENT)
Age: 37
End: 2022-10-12

## 2022-10-21 NOTE — OB RN PATIENT PROFILE - HEALTH/HEALTHCARE ANXIETIES, PROFILE
Encounter addended by: Sameera Fermin on: 10/21/2022 8:47 AM   Actions taken: Flowsheet accepted none at this time

## 2022-10-27 ENCOUNTER — APPOINTMENT (OUTPATIENT)
Dept: NEUROLOGY | Facility: CLINIC | Age: 37
End: 2022-10-27

## 2022-10-27 PROCEDURE — 99215 OFFICE O/P EST HI 40 MIN: CPT | Mod: 95

## 2022-10-27 RX ORDER — CHROMIUM 200 MCG
TABLET ORAL
Refills: 0 | Status: ACTIVE | COMMUNITY

## 2022-10-27 NOTE — HISTORY OF PRESENT ILLNESS
[FreeTextEntry1] : verbal consent given on 10/27/2022 and 08:55  by RYAN BILLS at [address] [place] [address]\[place] [address]\par \par \par 37 W who has seen Dr. Blackmon in Jan. She has symptoms suggestive of migraine without aura. She never tried the topamax as she's not interested in a daily medication. Her migraines occur the first day of her menses. Sometimes the Excedrin migraine works. She thinks stress also worsens her migraines. At times she vomits and at other times the migraine lasts all day. Location: between her eyebrows. \par \par she didn't bring the MRI report that was done recently. \par \par interval history: She tried the mag oxide but it didn't help the menstrual migraines. She stopped it because she thought it was hurting her stomach. in hindsight, it may have been the excedrin migraine that she's using. She still has headaches about 2-3 a week. \par \par interval history: She is currently 14 weeks pregnant with her first child. She has been fine with exception of a migraine that lasted 5 days. We discussed safe options during pregnancy if she has migraines. \par \par Interval history: Since her last visit with me patient saw MsSincere Katy for pulsatile tinnitus however patient states that that has resolved and she did not obtain any of the imaging. Patient's migraines returned after the birth of her child. Patient has it about once a week or once every other week. \par \par interval history: Patient was last seen in February 2021.  Patient is 6 weeks postpartum with her second child and she has recurrence of her migraines.  She will soon start a new birth control medication that is progesterone only.  We will see how that helps with her headache.  She is currently on riboflavin 400 mg once a day and she will increase it to twice a day.  Patient will continue magnesium oxide 250 twice a day as the 400 mg upsets her stomach.  Patient also is on coenzyme Q 10 for her headaches.  We discussed conventional prophylactic medication such as Topamax, amitriptyline, and propranolol but they are all present in breastmilk.  Patient was hesitant.  Patient was also advised that the newer agents such as the CGRP antagonist and gepants do not have enough data for us to authorize use while she is breast-feeding.  We also went over nonpharmacologic treatments of her headaches such as getting enough sleep and avoiding caffeine withdrawal headaches.\par \par CONSENT FOR VIDEO MEDICAL VISIT1. I understand that my physician wishes me to engage in a telemedicine consultation.2. My physician has explained to me how the video conferencing technology will be used to affect such a consultation will not be the same as a direct patient/health care provider visit due to the fact that I will not be in the same room as my physician 3. I understand there are potential risks to this technology, including interruptions, unauthorized access and technical difficulties. I understand that my health care provider or I can discontinue the telemedicine consult/visit if it is felt that the videoconferencing connections are not adequate for the situation.4. I understand that my healthcare information may be shared with other individuals for scheduling and billing purposes. Others may also be present during the consultation other than my physician and consulting health care provider in order to operate the video equipment. The above mentioned people will all maintain confidentiality of the information obtained. I further understand that I will be informed of their presence in the consultation and thus will have the right to request the following: (1) omit specific details of my medical history/physical examination that are personally sensitive to me; (2) ask non-medical personnel to leave the telemedicine examination room: and or (3) terminate the consultation at any time.5. I have had the alternatives to a telemedicine consultation explained to me, and in choosing to participate in a telemedicine consultation. I understand that some parts of the exam involving physical tests may be conducted by individuals at my location at the direction of the consulting health care provider.6. In an emergent consultation, I understand that the responsibility of the telemedicine consulting specialist is to advise my local practitioner and that the specialist’s responsibility will conclude upon the termination of the video conference connection.7. I understand that billing will occur from both my physician and as a facility fee from the site from which I am presented.8. I have had a direct conversation with my physician, during which I had the opportunity to ask questions in regard to this procedure. My questions have been answered and the risks, benefits and any practical alternatives have been discussed with me in a language in which I understand\par \par

## 2022-10-27 NOTE — DISCUSSION/SUMMARY
[FreeTextEntry1] : 37-year-old woman who is here after that the birth of her second child.  Her migraine returned similar to her postpartum period after her first child and she will continue riboflavin 400 mg twice a day instead of once a day as she is currently on.  Patient will also continue magnesium oxide as much as tolerated and coenzyme Q 10.  Patient will call if her headaches worsen.  We may consider occipital nerve blocks.\par \par physician location: office\par patient location: home\par \par I spent 40 minutes of total time, during which more than 50% of the time was spent on counseling. I explained the diagnosis, other possible diagnoses, workup, and management, as well as answered any questions.\par \par This is a telemedicine visit that was performed using real time 2-way audiovisual technology. Verbal consent to participate in video visit was obtained and patient was aware of their right to refuse to participate in services delivered via telemedicine and the alternative and potential limitations of participating in a telemedicine visit vs a face-to-face visit; I have also informed the patient of my current location in the [facility] and the names of all persons participating in the telemedicine service and their role in the encounter. The patient agrees to have this service via Telehealth.\par \par  This visit occurred during the Coronavirus (COVID-19) Public Health Emergency. I discussed with the patient the nature of our telemedicine visits, that: \par • I would evaluate the patient and recommend diagnostics and treatments based on my assessment \par • Our sessions are not being recorded and that personal health information is protected \par • Our team would provide follow up care in person if/when the patient needs it.\par \par \par

## 2022-11-17 NOTE — OB RN INTRAOPERATIVE NOTE - NS_ORROOM _OBGYN_ALL_OB
D
Pt states " I was admitted upstairs. I left for a cigarette and I was told I needed to come back through the ER"
Transportation service/public transportation

## 2023-02-08 ENCOUNTER — APPOINTMENT (OUTPATIENT)
Dept: NEUROLOGY | Facility: CLINIC | Age: 38
End: 2023-02-08
Payer: COMMERCIAL

## 2023-02-08 PROCEDURE — 99215 OFFICE O/P EST HI 40 MIN: CPT | Mod: 95

## 2023-02-08 NOTE — DISCUSSION/SUMMARY
[FreeTextEntry1] : 38-year-old woman who is here for frequent migraines without aura.  We had a lengthy discussion regarding preventative treatment while she is still breast-feeding.  Patient was interested in trying the Cefaly device.  Paperwork was filled out for her and will have staff send it to the company.  If this along with acupuncture and chiropractor fails to help with her headache we may try Botox injections.\par \par physician location: office\par patient location: home\par \par I spent 40 minutes of total time, during which more than 50% of the time was spent on counseling. I explained the diagnosis, other possible diagnoses, workup, and management, as well as answered any questions.\par \par This is a telemedicine visit that was performed using real time 2-way audiovisual technology. Verbal consent to participate in video visit was obtained and patient was aware of their right to refuse to participate in services delivered via telemedicine and the alternative and potential limitations of participating in a telemedicine visit vs a face-to-face visit; I have also informed the patient of my current location in the [facility] and the names of all persons participating in the telemedicine service and their role in the encounter. The patient agrees to have this service via Telehealth.\par \par  This visit occurred during the Coronavirus (COVID-19) Public Health Emergency. I discussed with the patient the nature of our telemedicine visits, that: \par • I would evaluate the patient and recommend diagnostics and treatments based on my assessment \par • Our sessions are not being recorded and that personal health information is protected \par • Our team would provide follow up care in person if/when the patient needs it.\par \par

## 2023-02-08 NOTE — PHYSICAL EXAM
[General Appearance - Alert] : alert [Oriented To Time, Place, And Person] : oriented to person, place, and time [Paresis Pronator Drift Right-Sided] : no pronator drift on the right [Paresis Pronator Drift Left-Sided] : no pronator drift on the left [FreeTextEntry5] : Video froze

## 2023-02-08 NOTE — HISTORY OF PRESENT ILLNESS
[FreeTextEntry1] : verbal consent given on 02/08/2023 and 09:43  by RYAN BILLS at [address] [facility]\[place] [address]\par \par \par 38 W who has seen Dr. Blackmon in Jan. She has symptoms suggestive of migraine without aura. She never tried the topamax as she's not interested in a daily medication. Her migraines occur the first day of her menses. Sometimes the Excedrin migraine works. She thinks stress also worsens her migraines. At times she vomits and at other times the migraine lasts all day. Location: between her eyebrows. \par \par she didn't bring the MRI report that was done recently. \par \par interval history: She tried the mag oxide but it didn't help the menstrual migraines. She stopped it because she thought it was hurting her stomach. in hindsight, it may have been the excedrin migraine that she's using. She still has headaches about 2-3 a week. \par \par interval history: She is currently 14 weeks pregnant with her first child. She has been fine with exception of a migraine that lasted 5 days. We discussed safe options during pregnancy if she has migraines. \par \par Interval history: Since her last visit with me patient saw MsSincere Katy for pulsatile tinnitus however patient states that that has resolved and she did not obtain any of the imaging. Patient's migraines returned after the birth of her child. Patient has it about once a week or once every other week. \par \par interval history: Patient was last seen in February 2021.  Patient is 6 weeks postpartum with her second child and she has recurrence of her migraines.  She will soon start a new birth control medication that is progesterone only.  We will see how that helps with her headache.  She is currently on riboflavin 400 mg once a day and she will increase it to twice a day.  Patient will continue magnesium oxide 250 twice a day as the 400 mg upsets her stomach.  Patient also is on coenzyme Q 10 for her headaches.  We discussed conventional prophylactic medication such as Topamax, amitriptyline, and propranolol but they are all present in breastmilk.  Patient was hesitant.  Patient was also advised that the newer agents such as the CGRP antagonist and gepants do not have enough data for us to authorize use while she is breast-feeding.  We also went over nonpharmacologic treatments of her headaches such as getting enough sleep and avoiding caffeine withdrawal headaches.\par \par Interval history February 8, 2023: Since her last visit in October patient was asked to start riboflavin 400 mg twice a day and magnesium oxide along with coenzyme every 10 on a regular basis.  Patient states that despite taking her medication on a regular basis she still continues to have headache about 2 to 3 days in a week.  Patient takes Tylenol which helps.  Patient is interested in going back to acupuncture and chiropractor for headache relief as she is still breast-feeding.  We went over certain medications such as cyproheptadine and sumatriptan however both seem to be in breastmilk and patient would like to avoid that. \par \par CONSENT FOR VIDEO MEDICAL VISIT1. I understand that my physician wishes me to engage in a telemedicine consultation.2. My physician has explained to me how the video conferencing technology will be used to affect such a consultation will not be the same as a direct patient/health care provider visit due to the fact that I will not be in the same room as my physician 3. I understand there are potential risks to this technology, including interruptions, unauthorized access and technical difficulties. I understand that my health care provider or I can discontinue the telemedicine consult/visit if it is felt that the videoconferencing connections are not adequate for the situation.4. I understand that my healthcare information may be shared with other individuals for scheduling and billing purposes. Others may also be present during the consultation other than my physician and consulting health care provider in order to operate the video equipment. The above mentioned people will all maintain confidentiality of the information obtained. I further understand that I will be informed of their presence in the consultation and thus will have the right to request the following: (1) omit specific details of my medical history/physical examination that are personally sensitive to me; (2) ask non-medical personnel to leave the telemedicine examination room: and or (3) terminate the consultation at any time.5. I have had the alternatives to a telemedicine consultation explained to me, and in choosing to participate in a telemedicine consultation. I understand that some parts of the exam involving physical tests may be conducted by individuals at my location at the direction of the consulting health care provider.6. In an emergent consultation, I understand that the responsibility of the telemedicine consulting specialist is to advise my local practitioner and that the specialist’s responsibility will conclude upon the termination of the video conference connection.7. I understand that billing will occur from both my physician and as a facility fee from the site from which I am presented.8. I have had a direct conversation with my physician, during which I had the opportunity to ask questions in regard to this procedure. My questions have been answered and the risks, benefits and any practical alternatives have been discussed with me in a language in which I understand\par \par

## 2023-03-28 ENCOUNTER — NON-APPOINTMENT (OUTPATIENT)
Age: 38
End: 2023-03-28

## 2023-03-30 ENCOUNTER — APPOINTMENT (OUTPATIENT)
Dept: PAIN MANAGEMENT | Facility: CLINIC | Age: 38
End: 2023-03-30
Payer: COMMERCIAL

## 2023-03-30 VITALS
WEIGHT: 116 LBS | HEIGHT: 60 IN | HEART RATE: 71 BPM | BODY MASS INDEX: 22.78 KG/M2 | SYSTOLIC BLOOD PRESSURE: 128 MMHG | DIASTOLIC BLOOD PRESSURE: 85 MMHG

## 2023-03-30 PROCEDURE — 99214 OFFICE O/P EST MOD 30 MIN: CPT

## 2023-04-05 NOTE — DISCHARGE NOTE OB - YES, WALK AS TOLERATED
Telephoned patient.      Scheduled colon/EGD Tuesday June 6, 2023, at 1:00 pm.  Patient already has prep.    Verbally went over prep instructions and attached to [employer].  Will continue to watch for cancellations.       Statement Selected

## 2023-04-12 NOTE — REVIEW OF SYSTEMS
[Feeling Poorly] : feeling poorly [Feeling Tired] : feeling tired [Eye Pain] : eye pain [Arthralgias] : arthralgias [Sleep Disturbances] : sleep disturbances [Fever] : no fever [Chills] : no chills [Eyesight Problems] : no eyesight problems [Nasal Discharge] : no nasal discharge [Palpitations] : no palpitations [Chest Pain] : no chest pain [Cough] : no cough [Constipation] : no constipation [Lower Back Pain] : no lower back pain [Skin Lesions] : no skin lesions [Itching] : no itching [Fainting] : no fainting [Muscle Weakness] : no muscle weakness

## 2023-04-12 NOTE — PHYSICAL EXAM
[General Appearance - Alert] : alert [General Appearance - Well Developed] : well developed [General Appearance - Well Nourished] : well nourished [General Appearance - Well-Appearing] : healthy appearing [Oriented To Time, Place, And Person] : oriented to person, place, and time [Affect] : the affect was normal [Impaired Insight] : insight and judgment were intact [Memory Recent] : recent memory was not impaired [Memory Remote] : remote memory was not impaired [Person] : oriented to person [Place] : oriented to place [Time] : oriented to time [Short Term Intact] : short term memory intact [Remote Intact] : remote memory intact [Registration Intact] : recent registration memory intact [Concentration Intact] : normal concentrating ability [Visual Intact] : visual attention was ~T not ~L decreased [Writing A Sentence] : no difficulty writing a sentence [Fluency] : fluency intact [Comprehension] : comprehension intact [Reading] : reading intact [Current Events] : adequate knowledge of current events [Past History] : adequate knowledge of personal past history [Cranial Nerves Oculomotor (III)] : extraocular motion intact [Cranial Nerves Facial (VII)] : face symmetrical [Cranial Nerves Vestibulocochlear (VIII)] : hearing was intact bilaterally [Cranial Nerves Accessory (XI - Cranial And Spinal)] : head turning and shoulder shrug symmetric [Cranial Nerves Hypoglossal (XII)] : there was no tongue deviation with protrusion [No Muscle Atrophy] : normal bulk in all four extremities [Motor Handedness Right-Handed] : the patient is right hand dominant [Motor Strength Upper Extremities Bilaterally] : strength was normal in both upper extremities [Allodynia] : no ~T allodynia present [Tremor] : no tremor present [Abnormal Walk] : normal gait [Dysdiadochokinesia Bilaterally] : not present [Sclera] : the sclera and conjunctiva were normal [No MONSERRAT] : no internuclear ophthalmoplegia [Strabismus] : no strabismus was seen [Hearing Threshold Finger Rub Not Barry] : hearing was normal [Outer Ear] : the ears and nose were normal in appearance [Neck Appearance] : the appearance of the neck was normal [Neck Cervical Mass (___cm)] : no neck mass was observed [Exaggerated Use Of Accessory Muscles For Inspiration] : no accessory muscle use [Nail Clubbing] : no clubbing  or cyanosis of the fingernails [Involuntary Movements] : no involuntary movements were seen [] : no rash

## 2023-04-12 NOTE — HISTORY OF PRESENT ILLNESS
[Headache] : headache [Nausea] : nausea [Photophobia] : photophobia [Phonophobia] : phonophobia [Scalp Tenderness] : scalp tenderness [> 4 hours] : > 4 hours [Stable] : The patient reports ~his/her~ symptoms since the last visit are stable [FreeTextEntry1] : Pt is 39 yo woman who has a history of headaches since teen years.  No clear family history.\par Over time she has had variable course with her headaches.  With 1st pregnancy had headaches which improved towards end of cycle but then returned later.\par With current child she has now had more protracted course over the last 6 months.  has felt overall generalized muscle pain.  Light and sound sensitivity.  Pain in eyes- more often left sided.  Gets increased thirst as well.\par has not resumed her cycle and notes that she usually would only get migrianes around her cycle as well.\par While intensity has not been so bad she is afraid it will worsen.\par Mood is slightly variable.\par had been seeing Dr. Alarcon who had her on mg, coq-10, b2 and prenatal vit.\par had prescribed Nerivio which ? if she gave it enough benefit.  Didn't "really give it enough chance."\par Would use excedrin migraine which "generally worked" but only tylneol during pregnancy.\par more recently retried excedrin but didn't see much benefit.\par Notes headache "always on weekend."\par Has recently started back to work on 2 weeks ago.\par Has had use of 3 cups caffeine/ day recently decreased to 2 cups.\par Occasional neck pain.

## 2023-05-12 ENCOUNTER — APPOINTMENT (OUTPATIENT)
Dept: NEUROLOGY | Facility: CLINIC | Age: 38
End: 2023-05-12

## 2023-05-15 NOTE — H&P ADULT - NSHPPHYSICALEXAM_GEN_ALL_CORE
72 Vital Signs Last 24 Hrs  T(C): 36.8 (14 Jul 2020 02:20), Max: 37.4 (13 Jul 2020 17:51)  T(F): 98.2 (14 Jul 2020 02:20), Max: 99.4 (13 Jul 2020 17:51)  HR: 69 (14 Jul 2020 02:20) (69 - 88)  BP: 100/82 (14 Jul 2020 02:20) (100/82 - 132/83)  BP(mean): --  RR: 17 (14 Jul 2020 02:20) (16 - 20)  SpO2: 100% (14 Jul 2020 02:20) (97% - 100%)    PHYSICAL EXAM:   Gen: NAD, alert and oriented x 3  Cardiovascular: regular   Respiratory: breathing comfortably on RA  Abd: soft, non-distended, pfannenstiel skin incision with 1cm opening with purulent discharge. Mild tenderness to touch on outer edges of incision. Erythema noted surrounding incision, smaller than pen line previously outlined in office. Purulence mildly expressed with pressure above incision. No masses or nodules palpated along, above, or below incisional line.   Extremities: NTBL  Skin: warm and well perfused

## 2023-06-23 ENCOUNTER — APPOINTMENT (OUTPATIENT)
Dept: PAIN MANAGEMENT | Facility: CLINIC | Age: 38
End: 2023-06-23

## 2023-06-23 ENCOUNTER — NON-APPOINTMENT (OUTPATIENT)
Age: 38
End: 2023-06-23

## 2023-07-14 ENCOUNTER — APPOINTMENT (OUTPATIENT)
Dept: PAIN MANAGEMENT | Facility: CLINIC | Age: 38
End: 2023-07-14
Payer: COMMERCIAL

## 2023-07-14 VITALS
BODY MASS INDEX: 22.78 KG/M2 | HEIGHT: 60 IN | DIASTOLIC BLOOD PRESSURE: 86 MMHG | HEART RATE: 71 BPM | SYSTOLIC BLOOD PRESSURE: 126 MMHG | WEIGHT: 116 LBS

## 2023-07-14 PROCEDURE — 99213 OFFICE O/P EST LOW 20 MIN: CPT

## 2023-07-14 NOTE — HISTORY OF PRESENT ILLNESS
[FreeTextEntry1] : Pt returns today for a follow up appt. \par Migraine is ~3x/ week. Pt taking Rizatriptan prn and Naproxen prn . Pt has the most effective relief when taken together . Pt is breast feeding. Pediatrician  aware. \par \par Has not had an aura since pregnancy . \par Menses has returned ( 2 cycles , which is a trigger ) .   [Headache] : headache [Nausea] : nausea [Photophobia] : photophobia [Phonophobia] : phonophobia

## 2023-07-14 NOTE — ASSESSMENT
[FreeTextEntry1] : 38 year old with migraine , currently breast feeding . \par Pt planning on weaning baby in the next 6 month . Once she is no longer breast feeding will consider a prevention.\par Discussed headache hygiene , food triggers.

## 2023-07-14 NOTE — PHYSICAL EXAM
[General Appearance - Alert] : alert [General Appearance - In No Acute Distress] : in no acute distress [General Appearance - Well-Appearing] : healthy appearing [Oriented To Time, Place, And Person] : oriented to person, place, and time [Affect] : the affect was normal [Mood] : the mood was normal [Memory Recent] : recent memory was not impaired [Memory Remote] : remote memory was not impaired [Cranial Nerves Facial (VII)] : face symmetrical [Cranial Nerves Accessory (XI - Cranial And Spinal)] : head turning and shoulder shrug symmetric [Cranial Nerves Hypoglossal (XII)] : there was no tongue deviation with protrusion [Motor Strength] : muscle strength was normal in all four extremities [Paresis Pronator Drift Right-Sided] : no pronator drift on the right [Paresis Pronator Drift Left-Sided] : no pronator drift on the left [Motor Strength Upper Extremities Bilaterally] : strength was normal in both upper extremities [Motor Strength Lower Extremities Bilaterally] : strength was normal in both lower extremities [Coordination - Dysmetria Impaired Finger-to-Nose Bilateral] : not present [Sclera] : the sclera and conjunctiva were normal [PERRL With Normal Accommodation] : pupils were equal in size, round, reactive to light, with normal accommodation [Extraocular Movements] : extraocular movements were intact [] : no respiratory distress [Edema] : there was no peripheral edema

## 2023-08-16 ENCOUNTER — NON-APPOINTMENT (OUTPATIENT)
Age: 38
End: 2023-08-16

## 2023-09-12 ENCOUNTER — NON-APPOINTMENT (OUTPATIENT)
Age: 38
End: 2023-09-12

## 2023-09-21 ENCOUNTER — RX RENEWAL (OUTPATIENT)
Age: 38
End: 2023-09-21

## 2023-09-21 ENCOUNTER — TRANSCRIPTION ENCOUNTER (OUTPATIENT)
Age: 38
End: 2023-09-21

## 2023-09-21 RX ORDER — NAPROXEN 500 MG/1
500 TABLET ORAL
Qty: 150 | Refills: 0 | Status: ACTIVE | COMMUNITY
Start: 2023-03-30 | End: 1900-01-01

## 2023-09-22 ENCOUNTER — TRANSCRIPTION ENCOUNTER (OUTPATIENT)
Age: 38
End: 2023-09-22

## 2023-10-10 NOTE — DISCHARGE NOTE OB - LAUNCH MEDICATION RECONCILIATION
Click here for Discharge Medication Review

## 2023-11-08 NOTE — ED ADULT NURSE NOTE - CHPI ED NUR SYMPTOMS NEG
Finasteride Male Counseling: Finasteride Counseling:  I discussed with the patient the risks of use of finasteride including but not limited to decreased libido, decreased ejaculate volume, gynecomastia, and depression. Women should not handle medication.  All of the patient's questions and concerns were addressed. Prednisone Counseling:  I discussed with the patient the risks of prolonged use of prednisone including but not limited to weight gain, insomnia, osteoporosis, mood changes, diabetes, susceptibility to infection, glaucoma and high blood pressure.  In cases where prednisone use is prolonged, patients should be monitored with blood pressure checks, serum glucose levels and an eye exam.  Additionally, the patient may need to be placed on GI prophylaxis, PCP prophylaxis, and calcium and vitamin D supplementation and/or a bisphosphonate.  The patient verbalized understanding of the proper use and the possible adverse effects of prednisone.  All of the patient's questions and concerns were addressed. Mirvaso Counseling: Mirvaso is a topical medication which can decrease superficial blood flow where applied. Side effects are uncommon and include stinging, redness and allergic reactions. Skyrizi Pregnancy And Lactation Text: The risk during pregnancy and breastfeeding is uncertain with this medication. Infliximab Counseling:  I discussed with the patient the risks of infliximab including but not limited to myelosuppression, immunosuppression, autoimmune hepatitis, demyelinating diseases, lymphoma, and serious infections.  The patient understands that monitoring is required including a PPD at baseline and must alert us or the primary physician if symptoms of infection or other concerning signs are noted. Olanzapine Pregnancy And Lactation Text: This medication is pregnancy category C.   There are no adequate and well controlled trials with olanzapine in pregnant females.  Olanzapine should be used during pregnancy only if the potential benefit justifies the potential risk to the fetus.   In a study in lactating healthy women, olanzapine was excreted in breast milk.  It is recommended that women taking olanzapine should not breast feed. Tetracycline Pregnancy And Lactation Text: This medication is Pregnancy Category D and not consider safe during pregnancy. It is also excreted in breast milk. SSKI Counseling:  I discussed with the patient the risks of SSKI including but not limited to thyroid abnormalities, metallic taste, GI upset, fever, headache, acne, arthralgias, paraesthesias, lymphadenopathy, easy bleeding, arrhythmias, and allergic reaction. Zyclara Counseling:  I discussed with the patient the risks of imiquimod including but not limited to erythema, scaling, itching, weeping, crusting, and pain.  Patient understands that the inflammatory response to imiquimod is variable from person to person and was educated regarded proper titration schedule.  If flu-like symptoms develop, patient knows to discontinue the medication and contact us. Topical Steroids Applications Pregnancy And Lactation Text: Most topical steroids are considered safe to use during pregnancy and lactation.  Any topical steroid applied to the breast or nipple should be washed off before breastfeeding. Qbrexza Pregnancy And Lactation Text: There is no available data on Qbrexza use in pregnant women.  There is no available data on Qbrexza use in lactation. Aklief counseling:  Patient advised to apply a pea-sized amount only at bedtime and wait 30 minutes after washing their face before applying.  If too drying, patient may add a non-comedogenic moisturizer.  The most commonly reported side effects including irritation, redness, scaling, dryness, stinging, burning, itching, and increased risk of sunburn.  The patient verbalized understanding of the proper use and possible adverse effects of retinoids.  All of the patient's questions and concerns were addressed. Tazorac Counseling:  Patient advised that medication is irritating and drying.  Patient may need to apply sparingly and wash off after an hour before eventually leaving it on overnight.  The patient verbalized understanding of the proper use and possible adverse effects of tazorac.  All of the patient's questions and concerns were addressed. Calcipotriene Pregnancy And Lactation Text: The use of this medication during pregnancy or lactation is not recommended as there is insufficient data. Erivedge Counseling- I discussed with the patient the risks of Erivedge including but not limited to nausea, vomiting, diarrhea, constipation, weight loss, changes in the sense of taste, decreased appetite, muscle spasms, and hair loss.  The patient verbalized understanding of the proper use and possible adverse effects of Erivedge.  All of the patient's questions and concerns were addressed. Azathioprine Pregnancy And Lactation Text: This medication is Pregnancy Category D and isn't considered safe during pregnancy. It is unknown if this medication is excreted in breast milk. Terbinafine Counseling: Patient counseling regarding adverse effects of terbinafine including but not limited to headache, diarrhea, rash, upset stomach, liver function test abnormalities, itching, taste/smell disturbance, nausea, abdominal pain, and flatulence.  There is a rare possibility of liver failure that can occur when taking terbinafine.  The patient understands that a baseline LFT and kidney function test may be required. The patient verbalized understanding of the proper use and possible adverse effects of terbinafine.  All of the patient's questions and concerns were addressed. Colchicine Counseling:  Patient counseled regarding adverse effects including but not limited to stomach upset (nausea, vomiting, stomach pain, or diarrhea).  Patient instructed to limit alcohol consumption while taking this medication.  Colchicine may reduce blood counts especially with prolonged use.  The patient understands that monitoring of kidney function and blood counts may be required, especially at baseline. The patient verbalized understanding of the proper use and possible adverse effects of colchicine.  All of the patient's questions and concerns were addressed. Albendazole Counseling:  I discussed with the patient the risks of albendazole including but not limited to cytopenia, kidney damage, nausea/vomiting and severe allergy.  The patient understands that this medication is being used in an off-label manner. Cantharidin Counseling:  I discussed with the patient the risks of Cantharidin including but not limited to pain, redness, burning, itching, and blistering. Include Pregnancy/Lactation Warning?: No Cosentyx Counseling:  I discussed with the patient the risks of Cosentyx including but not limited to worsening of Crohn's disease, immunosuppression, allergic reactions and infections.  The patient understands that monitoring is required including a PPD at baseline and must alert us or the primary physician if symptoms of infection or other concerning signs are noted. Stelara Counseling:  I discussed with the patient the risks of ustekinumab including but not limited to immunosuppression, malignancy, posterior leukoencephalopathy syndrome, and serious infections.  The patient understands that monitoring is required including a PPD at baseline and must alert us or the primary physician if symptoms of infection or other concerning signs are noted. Hydroquinone Counseling:  Patient advised that medication may result in skin irritation, lightening (hypopigmentation), dryness, and burning.  In the event of skin irritation, the patient was advised to reduce the amount of the drug applied or use it less frequently.  Rarely, spots that are treated with hydroquinone can become darker (pseudoochronosis).  Should this occur, patient instructed to stop medication and call the office. The patient verbalized understanding of the proper use and possible adverse effects of hydroquinone.  All of the patient's questions and concerns were addressed. Infliximab Pregnancy And Lactation Text: This medication is Pregnancy Category B and is considered safe during pregnancy. It is unknown if this medication is excreted in breast milk. Rinvoq Pregnancy And Lactation Text: Based on animal studies, Rinvoq may cause embryo-fetal harm when administered to pregnant women.  The medication should not be used in pregnancy.  Breastfeeding is not recommended during treatment and for 6 days after the last dose. Clindamycin Counseling: I counseled the patient regarding use of clindamycin as an antibiotic for prophylactic and/or therapeutic purposes. Clindamycin is active against numerous classes of bacteria, including skin bacteria. Side effects may include nausea, diarrhea, gastrointestinal upset, rash, hives, yeast infections, and in rare cases, colitis. Cellcept Counseling:  I discussed with the patient the risks of mycophenolate mofetil including but not limited to infection/immunosuppression, GI upset, hypokalemia, hypercholesterolemia, bone marrow suppression, lymphoproliferative disorders, malignancy, GI ulceration/bleed/perforation, colitis, interstitial lung disease, kidney failure, progressive multifocal leukoencephalopathy, and birth defects.  The patient understands that monitoring is required including a baseline creatinine and regular CBC testing. In addition, patient must alert us immediately if symptoms of infection or other concerning signs are noted. Mirvaso Pregnancy And Lactation Text: This medication has not been assigned a Pregnancy Risk Category. It is unknown if the medication is excreted in breast milk. Prednisone Pregnancy And Lactation Text: This medication is Pregnancy Category C and it isn't know if it is safe during pregnancy. This medication is excreted in breast milk. Rhofade Counseling: Rhofade is a topical medication which can decrease superficial blood flow where applied. Side effects are uncommon and include stinging, redness and allergic reactions. Hydroxychloroquine Pregnancy And Lactation Text: This medication has been shown to cause fetal harm but it isn't assigned a Pregnancy Risk Category. There are small amounts excreted in breast milk. Oral Minoxidil Counseling- I discussed with the patient the risks of oral minoxidil including but not limited to shortness of breath, swelling of the feet or ankles, dizziness, lightheadedness, unwanted hair growth and allergic reaction.  The patient verbalized understanding of the proper use and possible adverse effects of oral minoxidil.  All of the patient's questions and concerns were addressed. Cantharidin Pregnancy And Lactation Text: This medication has not been proven safe during pregnancy. It is unknown if this medication is excreted in breast milk. Sski Pregnancy And Lactation Text: This medication is Pregnancy Category D and isn't considered safe during pregnancy. It is excreted in breast milk. Tazorac Pregnancy And Lactation Text: This medication is not safe during pregnancy. It is unknown if this medication is excreted in breast milk. Zyclara Pregnancy And Lactation Text: This medication is Pregnancy Category C. It is unknown if this medication is excreted in breast milk. Topical Sulfur Applications Counseling: Topical Sulfur Counseling: Patient counseled that this medication may cause skin irritation or allergic reactions.  In the event of skin irritation, the patient was advised to reduce the amount of the drug applied or use it less frequently.   The patient verbalized understanding of the proper use and possible adverse effects of topical sulfur application.  All of the patient's questions and concerns were addressed. Erivedge Pregnancy And Lactation Text: This medication is Pregnancy Category X and is absolutely contraindicated during pregnancy. It is unknown if it is excreted in breast milk. Terbinafine Pregnancy And Lactation Text: This medication is Pregnancy Category B and is considered safe during pregnancy. It is also excreted in breast milk and breast feeding isn't recommended. Aklief Pregnancy And Lactation Text: It is unknown if this medication is safe to use during pregnancy.  It is unknown if this medication is excreted in breast milk.  Breastfeeding women should use the topical cream on the smallest area of the skin for the shortest time needed while breastfeeding.  Do not apply to nipple and areola. Fluconazole Counseling:  Patient counseled regarding adverse effects of fluconazole including but not limited to headache, diarrhea, nausea, upset stomach, liver function test abnormalities, taste disturbance, and stomach pain.  There is a rare possibility of liver failure that can occur when taking fluconazole.  The patient understands that monitoring of LFTs and kidney function test may be required, especially at baseline. The patient verbalized understanding of the proper use and possible adverse effects of fluconazole.  All of the patient's questions and concerns were addressed. Colchicine Pregnancy And Lactation Text: This medication is Pregnancy Category C and isn't considered safe during pregnancy. It is excreted in breast milk. 5-Fu Counseling: 5-Fluorouracil Counseling:  I discussed with the patient the risks of 5-fluorouracil including but not limited to erythema, scaling, itching, weeping, crusting, and pain. Albendazole Pregnancy And Lactation Text: This medication is Pregnancy Category C and it isn't known if it is safe during pregnancy. It is also excreted in breast milk. Rituxan Counseling:  I discussed with the patient the risks of Rituxan infusions. Side effects can include infusion reactions, severe drug rashes including mucocutaneous reactions, reactivation of latent hepatitis and other infections and rarely progressive multifocal leukoencephalopathy.  All of the patient's questions and concerns were addressed. no coffee grounds emesis/no abdominal pain/no vomiting/no back pain/no nausea/no pain/no fever Oral Minoxidil Pregnancy And Lactation Text: This medication should only be used when clearly needed if you are pregnant, attempting to become pregnant or breast feeding. Low Dose Naltrexone Counseling- I discussed with the patient the potential risks and side effects of low dose naltrexone including but not limited to: more vivid dreams, headaches, nausea, vomiting, abdominal pain, fatigue, dizziness, and anxiety. Thalidomide Counseling: I discussed with the patient the risks of thalidomide including but not limited to birth defects, anxiety, weakness, chest pain, dizziness, cough and severe allergy. Opzelura Counseling:  I discussed with the patient the risks of Opzelura including but not limited to nasopharngitis, bronchitis, ear infection, eosinophila, hives, diarrhea, folliculitis, tonsillitis, and rhinorrhea.  Taken orally, this medication has been linked to serious infections; higher rate of mortality; malignancy and lymphoproliferative disorders; major adverse cardiovascular events; thrombosis; thrombocytopenia, anemia, and neutropenia; and lipid elevations. Clindamycin Pregnancy And Lactation Text: This medication can be used in pregnancy if certain situations. Clindamycin is also present in breast milk. Hydroquinone Pregnancy And Lactation Text: This medication has not been assigned a Pregnancy Risk Category but animal studies failed to show danger with the topical medication. It is unknown if the medication is excreted in breast milk. Sotyktu Counseling:  I discussed the most common side effects of Sotyktu including: common cold, sore throat, sinus infections, cold sores, canker sores, folliculitis, and acne.? I also discussed more serious side effects of Sotyktu including but not limited to: serious allergic reactions; increased risk for infections such as TB; cancers such as lymphomas; rhabdomyolysis and elevated CPK; and elevated triglycerides and liver enzymes.? Bexarotene Counseling:  I discussed with the patient the risks of bexarotene including but not limited to hair loss, dry lips/skin/eyes, liver abnormalities, hyperlipidemia, pancreatitis, depression/suicidal ideation, photosensitivity, drug rash/allergic reactions, hypothyroidism, anemia, leukopenia, infection, cataracts, and teratogenicity.  Patient understands that they will need regular blood tests to check lipid profile, liver function tests, white blood cell count, thyroid function tests and pregnancy test if applicable. Quinolones Counseling:  I discussed with the patient the risks of fluoroquinolones including but not limited to GI upset, allergic reaction, drug rash, diarrhea, dizziness, photosensitivity, yeast infections, liver function test abnormalities, tendonitis/tendon rupture. Finasteride Pregnancy And Lactation Text: This medication is absolutely contraindicated during pregnancy. It is unknown if it is excreted in breast milk. Wartpeel Pregnancy And Lactation Text: This medication is Pregnancy Category X and contraindicated in pregnancy and in women who may become pregnant. It is unknown if this medication is excreted in breast milk. Libtayo Counseling- I discussed with the patient the risks of Libtayo including but not limited to nausea, vomiting, diarrhea, and bone or muscle pain.  The patient verbalized understanding of the proper use and possible adverse effects of Libtayo.  All of the patient's questions and concerns were addressed. Cibinqo Counseling: I discussed with the patient the risks of Cibinqo therapy including but not limited to common cold, nausea, headache, cold sores, increased blood CPK levels, dizziness, UTIs, fatigue, acne, and vomitting. Live vaccines should be avoided.  This medication has been linked to serious infections; higher rate of mortality; malignancy and lymphoproliferative disorders; major adverse cardiovascular events; thrombosis; thrombocytopenia and lymphopenia; lipid elevations; and retinal detachment. Fluconazole Pregnancy And Lactation Text: This medication is Pregnancy Category C and it isn't know if it is safe during pregnancy. It is also excreted in breast milk. Dapsone Counseling: I discussed with the patient the risks of dapsone including but not limited to hemolytic anemia, agranulocytosis, rashes, methemoglobinemia, kidney failure, peripheral neuropathy, headaches, GI upset, and liver toxicity.  Patients who start dapsone require monitoring including baseline LFTs and weekly CBCs for the first month, then every month thereafter.  The patient verbalized understanding of the proper use and possible adverse effects of dapsone.  All of the patient's questions and concerns were addressed. Topical Clindamycin Counseling: Patient counseled that this medication may cause skin irritation or allergic reactions.  In the event of skin irritation, the patient was advised to reduce the amount of the drug applied or use it less frequently.   The patient verbalized understanding of the proper use and possible adverse effects of clindamycin.  All of the patient's questions and concerns were addressed. Azelaic Acid Counseling: Patient counseled that medicine may cause skin irritation and to avoid applying near the eyes.  In the event of skin irritation, the patient was advised to reduce the amount of the drug applied or use it less frequently.   The patient verbalized understanding of the proper use and possible adverse effects of azelaic acid.  All of the patient's questions and concerns were addressed. Topical Sulfur Applications Pregnancy And Lactation Text: This medication is Pregnancy Category C and has an unknown safety profile during pregnancy. It is unknown if this topical medication is excreted in breast milk. Sotyktu Pregnancy And Lactation Text: There is insufficient data to evaluate whether or not Sotyktu is safe to use during pregnancy.? ?It is not known if Sotyktu passes into breast milk and whether or not it is safe to use when breastfeeding.?? Opioid Counseling: I discussed with the patient the potential side effects of opioids including but not limited to addiction, altered mental status, and depression. I stressed avoiding alcohol, benzodiazepines, muscle relaxants and sleep aids unless specifically okayed by a physician. The patient verbalized understanding of the proper use and possible adverse effects of opioids. All of the patient's questions and concerns were addressed. They were instructed to flush the remaining pills down the toilet if they did not need them for pain. Bexarotene Pregnancy And Lactation Text: This medication is Pregnancy Category X and should not be given to women who are pregnant or may become pregnant. This medication should not be used if you are breast feeding. Ivermectin Counseling:  Patient instructed to take medication on an empty stomach with a full glass of water.  Patient informed of potential adverse effects including but not limited to nausea, diarrhea, dizziness, itching, and swelling of the extremities or lymph nodes.  The patient verbalized understanding of the proper use and possible adverse effects of ivermectin.  All of the patient's questions and concerns were addressed. Taltz Counseling: I discussed with the patient the risks of ixekizumab including but not limited to immunosuppression, serious infections, worsening of inflammatory bowel disease and drug reactions.  The patient understands that monitoring is required including a PPD at baseline and must alert us or the primary physician if symptoms of infection or other concerning signs are noted. Rituxan Pregnancy And Lactation Text: This medication is Pregnancy Category C and it isn't know if it is safe during pregnancy. It is unknown if this medication is excreted in breast milk but similar antibodies are known to be excreted. Low Dose Naltrexone Pregnancy And Lactation Text: Naltrexone is pregnancy category C.  There have been no adequate and well-controlled studies in pregnant women.  It should be used in pregnancy only if the potential benefit justifies the potential risk to the fetus.   Limited data indicates that naltrexone is minimally excreted into breastmilk. Doxycycline Counseling:  Patient counseled regarding possible photosensitivity and increased risk for sunburn.  Patient instructed to avoid sunlight, if possible.  When exposed to sunlight, patients should wear protective clothing, sunglasses, and sunscreen.  The patient was instructed to call the office immediately if the following severe adverse effects occur:  hearing changes, easy bruising/bleeding, severe headache, or vision changes.  The patient verbalized understanding of the proper use and possible adverse effects of doxycycline.  All of the patient's questions and concerns were addressed. Cimetidine Counseling:  I discussed with the patient the risks of Cimetidine including but not limited to gynecomastia, headache, diarrhea, nausea, drowsiness, arrhythmias, pancreatitis, skin rashes, psychosis, bone marrow suppression and kidney toxicity. Otezla Counseling: The side effects of Otezla were discussed with the patient, including but not limited to worsening or new depression, weight loss, diarrhea, nausea, upper respiratory tract infection, and headache. Patient instructed to call the office should any adverse effect occur.  The patient verbalized understanding of the proper use and possible adverse effects of Otezla.  All the patient's questions and concerns were addressed. Imiquimod Counseling:  I discussed with the patient the risks of imiquimod including but not limited to erythema, scaling, itching, weeping, crusting, and pain.  Patient understands that the inflammatory response to imiquimod is variable from person to person and was educated regarded proper titration schedule.  If flu-like symptoms develop, patient knows to discontinue the medication and contact us. Winlevi Counseling:  I discussed with the patient the risks of topical clascoterone including but not limited to erythema, scaling, itching, and stinging. Patient voiced their understanding. Cyclophosphamide Counseling:  I discussed with the patient the risks of cyclophosphamide including but not limited to hair loss, hormonal abnormalities, decreased fertility, abdominal pain, diarrhea, nausea and vomiting, bone marrow suppression and infection. The patient understands that monitoring is required while taking this medication. Birth Control Pills Counseling: Birth Control Pill Counseling: I discussed with the patient the potential side effects of OCPs including but not limited to increased risk of stroke, heart attack, thrombophlebitis, deep venous thrombosis, hepatic adenomas, breast changes, GI upset, headaches, and depression.  The patient verbalized understanding of the proper use and possible adverse effects of OCPs. All of the patient's questions and concerns were addressed. Dupixent Counseling: I discussed with the patient the risks of dupilumab including but not limited to eye infection and irritation, cold sores, injection site reactions, worsening of asthma, allergic reactions and increased risk of parasitic infection.  Live vaccines should be avoided while taking dupilumab. Dupilumab will also interact with certain medications such as warfarin and cyclosporine. The patient understands that monitoring is required and they must alert us or the primary physician if symptoms of infection or other concerning signs are noted. Wartpeel Counseling:  I discussed with the patient the risks of Wartpeel including but not limited to erythema, scaling, itching, weeping, crusting, and pain. Solaraze Counseling:  I discussed with the patient the risks of Solaraze including but not limited to erythema, scaling, itching, weeping, crusting, and pain. Opzelura Pregnancy And Lactation Text: There is insufficient data to evaluate drug-associated risk for major birth defects, miscarriage, or other adverse maternal or fetal outcomes.  There is a pregnancy registry that monitors pregnancy outcomes in pregnant persons exposed to the medication during pregnancy.  It is unknown if this medication is excreted in breast milk.  Do not breastfeed during treatment and for about 4 weeks after the last dose. Libtayo Pregnancy And Lactation Text: This medication is contraindicated in pregnancy and when breast feeding. Azithromycin Counseling:  I discussed with the patient the risks of azithromycin including but not limited to GI upset, allergic reaction, drug rash, diarrhea, and yeast infections. Cibinqo Pregnancy And Lactation Text: It is unknown if this medication will adversely affect pregnancy or breast feeding.  You should not take this medication if you are currently pregnant or planning a pregnancy or while breastfeeding. Rifampin Counseling: I discussed with the patient the risks of rifampin including but not limited to liver damage, kidney damage, red-orange body fluids, nausea/vomiting and severe allergy. Doxycycline Pregnancy And Lactation Text: This medication is Pregnancy Category D and not consider safe during pregnancy. It is also excreted in breast milk but is considered safe for shorter treatment courses. Xeljanz Counseling: I discussed with the patient the risks of Xeljanz therapy including increased risk of infection, liver issues, headache, diarrhea, or cold symptoms. Live vaccines should be avoided. They were instructed to call if they have any problems. Griseofulvin Counseling:  I discussed with the patient the risks of griseofulvin including but not limited to photosensitivity, cytopenia, liver damage, nausea/vomiting and severe allergy.  The patient understands that this medication is best absorbed when taken with a fatty meal (e.g., ice cream or french fries). Dapsone Pregnancy And Lactation Text: This medication is Pregnancy Category C and is not considered safe during pregnancy or breast feeding. Topical Clindamycin Pregnancy And Lactation Text: This medication is Pregnancy Category B and is considered safe during pregnancy. It is unknown if it is excreted in breast milk. Azelaic Acid Pregnancy And Lactation Text: This medication is considered safe during pregnancy and breast feeding. Siliq Counseling:  I discussed with the patient the risks of Siliq including but not limited to new or worsening depression, suicidal thoughts and behavior, immunosuppression, malignancy, posterior leukoencephalopathy syndrome, and serious infections.  The patient understands that monitoring is required including a PPD at baseline and must alert us or the primary physician if symptoms of infection or other concerning signs are noted. There is also a special program designed to monitor depression which is required with Siliq. Otezla Pregnancy And Lactation Text: This medication is Pregnancy Category C and it isn't known if it is safe during pregnancy. It is unknown if it is excreted in breast milk. Isotretinoin Counseling: Patient should get monthly blood tests, not donate blood, not drive at night if vision affected, not share medication, and not undergo elective surgery for 6 months after tx completed. Side effects reviewed, pt to contact office should one occur. Dupixent Pregnancy And Lactation Text: This medication likely crosses the placenta but the risk for the fetus is uncertain. This medication is excreted in breast milk. Niacinamide Counseling: I recommended taking niacin or niacinamide, also know as vitamin B3, twice daily. Recent evidence suggests that taking vitamin B3 (500 mg twice daily) can reduce the risk of actinic keratoses and non-melanoma skin cancers. Side effects of vitamin B3 include flushing and headache. Drysol Counseling:  I discussed with the patient the risks of drysol/aluminum chloride including but not limited to skin rash, itching, irritation, burning. Opioid Pregnancy And Lactation Text: These medications can lead to premature delivery and should be avoided during pregnancy. These medications are also present in breast milk in small amounts. Winlevi Pregnancy And Lactation Text: This medication is considered safe during pregnancy and breastfeeding. Tranexamic Acid Counseling:  Patient advised of the small risk of bleeding problems with tranexamic acid. They were also instructed to call if they developed any nausea, vomiting or diarrhea. All of the patient's questions and concerns were addressed. Cyclophosphamide Pregnancy And Lactation Text: This medication is Pregnancy Category D and it isn't considered safe during pregnancy. This medication is excreted in breast milk. Picato Counseling:  I discussed with the patient the risks of Picato including but not limited to erythema, scaling, itching, weeping, crusting, and pain. Benzoyl Peroxide Counseling: Patient counseled that medicine may cause skin irritation and bleach clothing.  In the event of skin irritation, the patient was advised to reduce the amount of the drug applied or use it less frequently.   The patient verbalized understanding of the proper use and possible adverse effects of benzoyl peroxide.  All of the patient's questions and concerns were addressed. Griseofulvin Pregnancy And Lactation Text: This medication is Pregnancy Category X and is known to cause serious birth defects. It is unknown if this medication is excreted in breast milk but breast feeding should be avoided. Odomzo Counseling- I discussed with the patient the risks of Odomzo including but not limited to nausea, vomiting, diarrhea, constipation, weight loss, changes in the sense of taste, decreased appetite, muscle spasms, and hair loss.  The patient verbalized understanding of the proper use and possible adverse effects of Odomzo.  All of the patient's questions and concerns were addressed. Birth Control Pills Pregnancy And Lactation Text: This medication should be avoided if pregnant and for the first 30 days post-partum. Topical Ketoconazole Counseling: Patient counseled that this medication may cause skin irritation or allergic reactions.  In the event of skin irritation, the patient was advised to reduce the amount of the drug applied or use it less frequently.   The patient verbalized understanding of the proper use and possible adverse effects of ketoconazole.  All of the patient's questions and concerns were addressed. Solaraze Pregnancy And Lactation Text: This medication is Pregnancy Category B and is considered safe. There is some data to suggest avoiding during the third trimester. It is unknown if this medication is excreted in breast milk. Enbrel Counseling:  I discussed with the patient the risks of etanercept including but not limited to myelosuppression, immunosuppression, autoimmune hepatitis, demyelinating diseases, lymphoma, and infections.  The patient understands that monitoring is required including a PPD at baseline and must alert us or the primary physician if symptoms of infection or other concerning signs are noted. Azithromycin Pregnancy And Lactation Text: This medication is considered safe during pregnancy and is also secreted in breast milk. Erythromycin Counseling:  I discussed with the patient the risks of erythromycin including but not limited to GI upset, allergic reaction, drug rash, diarrhea, increase in liver enzymes, and yeast infections. Gabapentin Counseling: I discussed with the patient the risks of gabapentin including but not limited to dizziness, somnolence, fatigue and ataxia. Klisyri Counseling:  I discussed with the patient the risks of Klisyri including but not limited to erythema, scaling, itching, weeping, crusting, and pain. Litfulo Counseling: I discussed with the patient the risks of Litfulo therapy including but not limited to upper respiratory tract infections, shingles, cold sores, and nausea. Live vaccines should be avoided.  This medication has been linked to serious infections; higher rate of mortality; malignancy and lymphoproliferative disorders; major adverse cardiovascular events; thrombosis; gastrointestinal perforations; neutropenia; lymphopenia; anemia; liver enzyme elevations; and lipid elevations. Rifampin Pregnancy And Lactation Text: This medication is Pregnancy Category C and it isn't know if it is safe during pregnancy. It is also excreted in breast milk and should not be used if you are breast feeding. Tremfya Counseling: I discussed with the patient the risks of guselkumab including but not limited to immunosuppression, serious infections, and drug reactions.  The patient understands that monitoring is required including a PPD at baseline and must alert us or the primary physician if symptoms of infection or other concerning signs are noted. Cyclosporine Counseling:  I discussed with the patient the risks of cyclosporine including but not limited to hypertension, gingival hyperplasia,myelosuppression, immunosuppression, liver damage, kidney damage, neurotoxicity, lymphoma, and serious infections. The patient understands that monitoring is required including baseline blood pressure, CBC, CMP, lipid panel and uric acid, and then 1-2 times monthly CMP and blood pressure. Niacinamide Pregnancy And Lactation Text: These medications are considered safe during pregnancy. Xelmarcoz Pregnancy And Lactation Text: This medication is Pregnancy Category D and is not considered safe during pregnancy.  The risk during breast feeding is also uncertain. Isotretinoin Pregnancy And Lactation Text: This medication is Pregnancy Category X and is considered extremely dangerous during pregnancy. It is unknown if it is excreted in breast milk. Oxybutynin Counseling:  I discussed with the patient the risks of oxybutynin including but not limited to skin rash, drowsiness, dry mouth, difficulty urinating, and blurred vision. Tranexamic Acid Pregnancy And Lactation Text: It is unknown if this medication is safe during pregnancy or breast feeding. Soolantra Counseling: I discussed with the patients the risks of topial Soolantra. This is a medicine which decreases the number of mites and inflammation in the skin. You experience burning, stinging, eye irritation or allergic reactions.  Please call our office if you develop any problems from using this medication. VTAMA Counseling: I discussed with the patient that VTAMA is not for use in the eyes, mouth or mouth. They should call the office if they develop any signs of allergic reactions to VTAMA. The patient verbalized understanding of the proper use and possible adverse effects of VTAMA.  All of the patient's questions and concerns were addressed. Benzoyl Peroxide Pregnancy And Lactation Text: This medication is Pregnancy Category C. It is unknown if benzoyl peroxide is excreted in breast milk. Itraconazole Counseling:  I discussed with the patient the risks of itraconazole including but not limited to liver damage, nausea/vomiting, neuropathy, and severe allergy.  The patient understands that this medication is best absorbed when taken with acidic beverages such as non-diet cola or ginger ale.  The patient understands that monitoring is required including baseline LFTs and repeat LFTs at intervals.  The patient understands that they are to contact us or the primary physician if concerning signs are noted. Doxepin Counseling:  Patient advised that the medication is sedating and not to drive a car after taking this medication. Patient informed of potential adverse effects including but not limited to dry mouth, urinary retention, and blurry vision.  The patient verbalized understanding of the proper use and possible adverse effects of doxepin.  All of the patient's questions and concerns were addressed. Arava Counseling:  Patient counseled regarding adverse effects of Arava including but not limited to nausea, vomiting, abnormalities in liver function tests. Patients may develop mouth sores, rash, diarrhea, and abnormalities in blood counts. The patient understands that monitoring is required including LFTs and blood counts.  There is a rare possibility of scarring of the liver and lung problems that can occur when taking methotrexate. Persistent nausea, loss of appetite, pale stools, dark urine, cough, and shortness of breath should be reported immediately. Patient advised to discontinue Arava treatment and consult with a physician prior to attempting conception. The patient will have to undergo a treatment to eliminate Arava from the body prior to conception. Elidel Counseling: Patient may experience a mild burning sensation during topical application. Elidel is not approved in children less than 2 years of age. There have been case reports of hematologic and skin malignancies in patients using topical calcineurin inhibitors although causality is questionable. Adbry Counseling: I discussed with the patient the risks of tralokinumab including but not limited to eye infection and irritation, cold sores, injection site reactions, worsening of asthma, allergic reactions and increased risk of parasitic infection.  Live vaccines should be avoided while taking tralokinumab. The patient understands that monitoring is required and they must alert us or the primary physician if symptoms of infection or other concerning signs are noted. Spironolactone Counseling: Patient advised regarding risks of diarrhea, abdominal pain, hyperkalemia, birth defects (for female patients), liver toxicity and renal toxicity. The patient may need blood work to monitor liver and kidney function and potassium levels while on therapy. The patient verbalized understanding of the proper use and possible adverse effects of spironolactone.  All of the patient's questions and concerns were addressed. Simponi Counseling:  I discussed with the patient the risks of golimumab including but not limited to myelosuppression, immunosuppression, autoimmune hepatitis, demyelinating diseases, lymphoma, and serious infections.  The patient understands that monitoring is required including a PPD at baseline and must alert us or the primary physician if symptoms of infection or other concerning signs are noted. Nsaids Counseling: NSAID Counseling: I discussed with the patient that NSAIDs should be taken with food. Prolonged use of NSAIDs can result in the development of stomach ulcers.  Patient advised to stop taking NSAIDs if abdominal pain occurs.  The patient verbalized understanding of the proper use and possible adverse effects of NSAIDs.  All of the patient's questions and concerns were addressed. Klisyri Pregnancy And Lactation Text: It is unknown if this medication can harm a developing fetus or if it is excreted in breast milk. Bactrim Counseling:  I discussed with the patient the risks of sulfa antibiotics including but not limited to GI upset, allergic reaction, drug rash, diarrhea, dizziness, photosensitivity, and yeast infections.  Rarely, more serious reactions can occur including but not limited to aplastic anemia, agranulocytosis, methemoglobinemia, blood dyscrasias, liver or kidney failure, lung infiltrates or desquamative/blistering drug rashes. Erythromycin Pregnancy And Lactation Text: This medication is Pregnancy Category B and is considered safe during pregnancy. It is also excreted in breast milk. Litfulo Pregnancy And Lactation Text: Based on animal studies, Lifulo may cause embryo-fetal harm when administered to pregnant women.  The medication should not be used in pregnancy.  Breastfeeding is not recommended during treatment. High Dose Vitamin A Counseling: Side effects reviewed, pt to contact office should one occur. Sarecycline Counseling: Patient advised regarding possible photosensitivity and discoloration of the teeth, skin, lips, tongue and gums.  Patient instructed to avoid sunlight, if possible.  When exposed to sunlight, patients should wear protective clothing, sunglasses, and sunscreen.  The patient was instructed to call the office immediately if the following severe adverse effects occur:  hearing changes, easy bruising/bleeding, severe headache, or vision changes.  The patient verbalized understanding of the proper use and possible adverse effects of sarecycline.  All of the patient's questions and concerns were addressed. Soolantra Pregnancy And Lactation Text: This medication is Pregnancy Category C. This medication is considered safe during breast feeding. Protopic Counseling: Patient may experience a mild burning sensation during topical application. Protopic is not approved in children less than 2 years of age. There have been case reports of hematologic and skin malignancies in patients using topical calcineurin inhibitors although causality is questionable. Vtama Pregnancy And Lactation Text: It is unknown if this medication can cause problems during pregnancy and breastfeeding. Doxepin Pregnancy And Lactation Text: This medication is Pregnancy Category C and it isn't known if it is safe during pregnancy. It is also excreted in breast milk and breast feeding isn't recommended. Valtrex Counseling: I discussed with the patient the risks of valacyclovir including but not limited to kidney damage, nausea, vomiting and severe allergy.  The patient understands that if the infection seems to be worsening or is not improving, they are to call. Topical Metronidazole Counseling: Metronidazole is a topical antibiotic medication. You may experience burning, stinging, redness, or allergic reactions.  Please call our office if you develop any problems from using this medication. Acitretin Counseling:  I discussed with the patient the risks of acitretin including but not limited to hair loss, dry lips/skin/eyes, liver damage, hyperlipidemia, depression/suicidal ideation, photosensitivity.  Serious rare side effects can include but are not limited to pancreatitis, pseudotumor cerebri, bony changes, clot formation/stroke/heart attack.  Patient understands that alcohol is contraindicated since it can result in liver toxicity and significantly prolong the elimination of the drug by many years. High Dose Vitamin A Pregnancy And Lactation Text: High dose vitamin A therapy is contraindicated during pregnancy and breast feeding. Xolair Counseling:  Patient informed of potential adverse effects including but not limited to fever, muscle aches, rash and allergic reactions.  The patient verbalized understanding of the proper use and possible adverse effects of Xolair.  All of the patient's questions and concerns were addressed. Spironolactone Pregnancy And Lactation Text: This medication can cause feminization of the male fetus and should be avoided during pregnancy. The active metabolite is also found in breast milk. Carac Counseling:  I discussed with the patient the risks of Carac including but not limited to erythema, scaling, itching, weeping, crusting, and pain. Bactrim Pregnancy And Lactation Text: This medication is Pregnancy Category D and is known to cause fetal risk.  It is also excreted in breast milk. Olumiant Counseling: I discussed with the patient the risks of Olumiant therapy including but not limited to upper respiratory tract infections, shingles, cold sores, and nausea. Live vaccines should be avoided.  This medication has been linked to serious infections; higher rate of mortality; malignancy and lymphoproliferative disorders; major adverse cardiovascular events; thrombosis; gastrointestinal perforations; neutropenia; lymphopenia; anemia; liver enzyme elevations; and lipid elevations. Glycopyrrolate Counseling:  I discussed with the patient the risks of glycopyrrolate including but not limited to skin rash, drowsiness, dry mouth, difficulty urinating, and blurred vision. Ilumya Counseling: I discussed with the patient the risks of tildrakizumab including but not limited to immunosuppression, malignancy, posterior leukoencephalopathy syndrome, and serious infections.  The patient understands that monitoring is required including a PPD at baseline and must alert us or the primary physician if symptoms of infection or other concerning signs are noted. Propranolol Counseling:  I discussed with the patient the risks of propranolol including but not limited to low heart rate, low blood pressure, low blood sugar, restlessness and increased cold sensitivity. They should call the office if they experience any of these side effects. Methotrexate Counseling:  Patient counseled regarding adverse effects of methotrexate including but not limited to nausea, vomiting, abnormalities in liver function tests. Patients may develop mouth sores, rash, diarrhea, and abnormalities in blood counts. The patient understands that monitoring is required including LFT's and blood counts.  There is a rare possibility of scarring of the liver and lung problems that can occur when taking methotrexate. Persistent nausea, loss of appetite, pale stools, dark urine, cough, and shortness of breath should be reported immediately. Patient advised to discontinue methotrexate treatment at least three months before attempting to become pregnant.  I discussed the need for folate supplements while taking methotrexate.  These supplements can decrease side effects during methotrexate treatment. The patient verbalized understanding of the proper use and possible adverse effects of methotrexate.  All of the patient's questions and concerns were addressed. Hydroxyzine Counseling: Patient advised that the medication is sedating and not to drive a car after taking this medication.  Patient informed of potential adverse effects including but not limited to dry mouth, urinary retention, and blurry vision.  The patient verbalized understanding of the proper use and possible adverse effects of hydroxyzine.  All of the patient's questions and concerns were addressed. Zoryve Counseling:  I discussed with the patient that Zoryve is not for use in the eyes, mouth or vagina. The most commonly reported side effects include diarrhea, headache, insomnia, application site pain, upper respiratory tract infections, and urinary tract infections.  All of the patient's questions and concerns were addressed. Minoxidil Counseling: Minoxidil is a topical medication which can increase blood flow where it is applied. It is uncertain how this medication increases hair growth. Side effects are uncommon and include stinging and allergic reactions. Dutasteride Male Counseling: Dustasteride Counseling:  I discussed with the patient the risks of use of dutasteride including but not limited to decreased libido, decreased ejaculate volume, and gynecomastia. Women who can become pregnant should not handle medication.  All of the patient's questions and concerns were addressed. Adbry Pregnancy And Lactation Text: It is unknown if this medication will adversely affect pregnancy or breast feeding. Metronidazole Counseling:  I discussed with the patient the risks of metronidazole including but not limited to seizures, nausea/vomiting, a metallic taste in the mouth, nausea/vomiting and severe allergy. Humira Counseling:  I discussed with the patient the risks of adalimumab including but not limited to myelosuppression, immunosuppression, autoimmune hepatitis, demyelinating diseases, lymphoma, and serious infections.  The patient understands that monitoring is required including a PPD at baseline and must alert us or the primary physician if symptoms of infection or other concerning signs are noted. Nsaids Pregnancy And Lactation Text: These medications are considered safe up to 30 weeks gestation. It is excreted in breast milk. Valtrex Pregnancy And Lactation Text: this medication is Pregnancy Category B and is considered safe during pregnancy. This medication is not directly found in breast milk but it's metabolite acyclovir is present. Clofazimine Counseling:  I discussed with the patient the risks of clofazimine including but not limited to skin and eye pigmentation, liver damage, nausea/vomiting, gastrointestinal bleeding and allergy. Protopic Pregnancy And Lactation Text: This medication is Pregnancy Category C. It is unknown if this medication is excreted in breast milk when applied topically. Olumiant Pregnancy And Lactation Text: Based on animal studies, Olumiant may cause embryo-fetal harm when administered to pregnant women.  The medication should not be used in pregnancy.  Breastfeeding is not recommended during treatment. Acitretin Pregnancy And Lactation Text: This medication is Pregnancy Category X and should not be given to women who are pregnant or may become pregnant in the future. This medication is excreted in breast milk. Topical Retinoid counseling:  Patient advised to apply a pea-sized amount only at bedtime and wait 30 minutes after washing their face before applying.  If too drying, patient may add a non-comedogenic moisturizer. The patient verbalized understanding of the proper use and possible adverse effects of retinoids.  All of the patient's questions and concerns were addressed. Ketoconazole Counseling:   Patient counseled regarding improving absorption with orange juice.  Adverse effects include but are not limited to breast enlargement, headache, diarrhea, nausea, upset stomach, liver function test abnormalities, taste disturbance, and stomach pain.  There is a rare possibility of liver failure that can occur when taking ketoconazole. The patient understands that monitoring of LFTs may be required, especially at baseline. The patient verbalized understanding of the proper use and possible adverse effects of ketoconazole.  All of the patient's questions and concerns were addressed. Topical Metronidazole Pregnancy And Lactation Text: This medication is Pregnancy Category B and considered safe during pregnancy.  It is also considered safe to use while breastfeeding. Metronidazole Pregnancy And Lactation Text: This medication is Pregnancy Category B and considered safe during pregnancy.  It is also excreted in breast milk. Xolair Pregnancy And Lactation Text: This medication is Pregnancy Category B and is considered safe during pregnancy. This medication is excreted in breast milk. Olanzapine Counseling- I discussed with the patient the common side effects of olanzapine including but are not limited to: lack of energy, dry mouth, increased appetite, sleepiness, tremor, constipation, dizziness, changes in behavior, or restlessness.  Explained that teenagers are more likely to experience headaches, abdominal pain, pain in the arms or legs, tiredness, and sleepiness.  Serious side effects include but are not limited: increased risk of death in elderly patients who are confused, have memory loss, or dementia-related psychosis; hyperglycemia; increased cholesterol and triglycerides; and weight gain. Tetracycline Counseling: Patient counseled regarding possible photosensitivity and increased risk for sunburn.  Patient instructed to avoid sunlight, if possible.  When exposed to sunlight, patients should wear protective clothing, sunglasses, and sunscreen.  The patient was instructed to call the office immediately if the following severe adverse effects occur:  hearing changes, easy bruising/bleeding, severe headache, or vision changes.  The patient verbalized understanding of the proper use and possible adverse effects of tetracycline.  All of the patient's questions and concerns were addressed. Patient understands to avoid pregnancy while on therapy due to potential birth defects. Propranolol Pregnancy And Lactation Text: This medication is Pregnancy Category C and it isn't known if it is safe during pregnancy. It is excreted in breast milk. Skyrizi Counseling: I discussed with the patient the risks of risankizumab-rzaa including but not limited to immunosuppression, and serious infections.  The patient understands that monitoring is required including a PPD at baseline and must alert us or the primary physician if symptoms of infection or other concerning signs are noted. Cimzia Counseling:  I discussed with the patient the risks of Cimzia including but not limited to immunosuppression, allergic reactions and infections.  The patient understands that monitoring is required including a PPD at baseline and must alert us or the primary physician if symptoms of infection or other concerning signs are noted. Glycopyrrolate Pregnancy And Lactation Text: This medication is Pregnancy Category B and is considered safe during pregnancy. It is unknown if it is excreted breast milk. Cephalexin Counseling: I counseled the patient regarding use of cephalexin as an antibiotic for prophylactic and/or therapeutic purposes. Cephalexin (commonly prescribed under brand name Keflex) is a cephalosporin antibiotic which is active against numerous classes of bacteria, including most skin bacteria. Side effects may include nausea, diarrhea, gastrointestinal upset, rash, hives, yeast infections, and in rare cases, hepatitis, kidney disease, seizures, fever, confusion, neurologic symptoms, and others. Patients with severe allergies to penicillin medications are cautioned that there is about a 10% incidence of cross-reactivity with cephalosporins. When possible, patients with penicillin allergies should use alternatives to cephalosporins for antibiotic therapy. Eucrisa Counseling: Patient may experience a mild burning sensation during topical application. Eucrisa is not approved in children less than 2 years of age. Dutasteride Pregnancy And Lactation Text: This medication is absolutely contraindicated in women, especially during pregnancy and breast feeding. Feminization of male fetuses is possible if taking while pregnant. Hydroxyzine Pregnancy And Lactation Text: This medication is not safe during pregnancy and should not be taken. It is also excreted in breast milk and breast feeding isn't recommended. Azathioprine Counseling:  I discussed with the patient the risks of azathioprine including but not limited to myelosuppression, immunosuppression, hepatotoxicity, lymphoma, and infections.  The patient understands that monitoring is required including baseline LFTs, Creatinine, possible TPMP genotyping and weekly CBCs for the first month and then every 2 weeks thereafter.  The patient verbalized understanding of the proper use and possible adverse effects of azathioprine.  All of the patient's questions and concerns were addressed. Methotrexate Pregnancy And Lactation Text: This medication is Pregnancy Category X and is known to cause fetal harm. This medication is excreted in breast milk. Calcipotriene Counseling:  I discussed with the patient the risks of calcipotriene including but not limited to erythema, scaling, itching, and irritation. Ketoconazole Pregnancy And Lactation Text: This medication is Pregnancy Category C and it isn't know if it is safe during pregnancy. It is also excreted in breast milk and breast feeding isn't recommended. Topical Steroids Counseling: I discussed with the patient that prolonged use of topical steroids can result in the increased appearance of superficial blood vessels (telangiectasias), lightening (hypopigmentation) and thinning of the skin (atrophy).  Patient understands to avoid using high potency steroids in skin folds, the groin or the face.  The patient verbalized understanding of the proper use and possible adverse effects of topical steroids.  All of the patient's questions and concerns were addressed. Detail Level: Zone Qbrexza Counseling:  I discussed with the patient the risks of Qbrexza including but not limited to headache, mydriasis, blurred vision, dry eyes, nasal dryness, dry mouth, dry throat, dry skin, urinary hesitation, and constipation.  Local skin reactions including erythema, burning, stinging, and itching can also occur. Minocycline Counseling: Patient advised regarding possible photosensitivity and discoloration of the teeth, skin, lips, tongue and gums.  Patient instructed to avoid sunlight, if possible.  When exposed to sunlight, patients should wear protective clothing, sunglasses, and sunscreen.  The patient was instructed to call the office immediately if the following severe adverse effects occur:  hearing changes, easy bruising/bleeding, severe headache, or vision changes.  The patient verbalized understanding of the proper use and possible adverse effects of minocycline.  All of the patient's questions and concerns were addressed. Cephalexin Pregnancy And Lactation Text: This medication is Pregnancy Category B and considered safe during pregnancy.  It is also excreted in breast milk but can be used safely for shorter doses. Cimzia Pregnancy And Lactation Text: This medication crosses the placenta but can be considered safe in certain situations. Cimzia may be excreted in breast milk. Rinvoq Counseling: I discussed with the patient the risks of Rinvoq therapy including but not limited to upper respiratory tract infections, shingles, cold sores, bronchitis, nausea, cough, fever, acne, and headache. Live vaccines should be avoided.  This medication has been linked to serious infections; higher rate of mortality; malignancy and lymphoproliferative disorders; major adverse cardiovascular events; thrombosis; thrombocytopenia, anemia, and neutropenia; lipid elevations; liver enzyme elevations; and gastrointestinal perforations. Hydroxychloroquine Counseling:  I discussed with the patient that a baseline ophthalmologic exam is needed at the start of therapy and every year thereafter while on therapy. A CBC may also be warranted for monitoring.  The side effects of this medication were discussed with the patient, including but not limited to agranulocytosis, aplastic anemia, seizures, rashes, retinopathy, and liver toxicity. Patient instructed to call the office should any adverse effect occur.  The patient verbalized understanding of the proper use and possible adverse effects of Plaquenil.  All the patient's questions and concerns were addressed.

## 2023-11-10 ENCOUNTER — APPOINTMENT (OUTPATIENT)
Dept: INTERNAL MEDICINE | Facility: CLINIC | Age: 38
End: 2023-11-10
Payer: COMMERCIAL

## 2023-11-10 VITALS
DIASTOLIC BLOOD PRESSURE: 80 MMHG | TEMPERATURE: 97.6 F | WEIGHT: 116 LBS | BODY MASS INDEX: 22.78 KG/M2 | HEART RATE: 65 BPM | RESPIRATION RATE: 17 BRPM | HEIGHT: 60 IN | SYSTOLIC BLOOD PRESSURE: 130 MMHG | OXYGEN SATURATION: 97 %

## 2023-11-10 DIAGNOSIS — H61.23 IMPACTED CERUMEN, BILATERAL: ICD-10-CM

## 2023-11-10 DIAGNOSIS — Z00.00 ENCOUNTER FOR GENERAL ADULT MEDICAL EXAMINATION W/OUT ABNORMAL FINDINGS: ICD-10-CM

## 2023-11-10 DIAGNOSIS — H65.93 UNSPECIFIED NONSUPPURATIVE OTITIS MEDIA, BILATERAL: ICD-10-CM

## 2023-11-10 DIAGNOSIS — Z87.19 PERSONAL HISTORY OF OTHER DISEASES OF THE DIGESTIVE SYSTEM: ICD-10-CM

## 2023-11-10 DIAGNOSIS — R10.9 UNSPECIFIED ABDOMINAL PAIN: ICD-10-CM

## 2023-11-10 DIAGNOSIS — Z87.09 PERSONAL HISTORY OF OTHER DISEASES OF THE RESPIRATORY SYSTEM: ICD-10-CM

## 2023-11-10 DIAGNOSIS — H90.0 CONDUCTIVE HEARING LOSS, BILATERAL: ICD-10-CM

## 2023-11-10 DIAGNOSIS — H93.A3 PULSATILE TINNITUS, BILATERAL: ICD-10-CM

## 2023-11-10 PROCEDURE — 99385 PREV VISIT NEW AGE 18-39: CPT

## 2023-11-28 NOTE — OB PROVIDER DELIVERY SUMMARY - NSCOUNTCORRECT_OBGYN_ALL_OB
Bilateral UE & LE grossly 3/5./grossly assessed due to
Laps, needles and instruments count was reported as correct.

## 2023-12-14 NOTE — OB RN DELIVERY SUMMARY - NS_PROPHYLACTICABXNAME_OBGYN_ALL_OB_FT
Patient is calling because she needs a medication refill on her prescription for progesterone. Confirmed patient's pharmacy of choice. Please advise.    Ancef

## 2023-12-18 ENCOUNTER — RX RENEWAL (OUTPATIENT)
Age: 38
End: 2023-12-18

## 2023-12-18 RX ORDER — RIZATRIPTAN BENZOATE 10 MG/1
10 TABLET ORAL
Qty: 36 | Refills: 0 | Status: ACTIVE | COMMUNITY
Start: 2023-03-30 | End: 1900-01-01

## 2023-12-19 ENCOUNTER — TRANSCRIPTION ENCOUNTER (OUTPATIENT)
Age: 38
End: 2023-12-19

## 2024-01-11 ENCOUNTER — APPOINTMENT (OUTPATIENT)
Dept: PAIN MANAGEMENT | Facility: CLINIC | Age: 39
End: 2024-01-11
Payer: COMMERCIAL

## 2024-01-11 VITALS
DIASTOLIC BLOOD PRESSURE: 77 MMHG | HEIGHT: 60 IN | SYSTOLIC BLOOD PRESSURE: 130 MMHG | HEART RATE: 64 BPM | BODY MASS INDEX: 23.56 KG/M2 | WEIGHT: 120 LBS

## 2024-01-11 DIAGNOSIS — E55.9 VITAMIN D DEFICIENCY, UNSPECIFIED: ICD-10-CM

## 2024-01-11 PROCEDURE — 99213 OFFICE O/P EST LOW 20 MIN: CPT

## 2024-01-11 RX ORDER — NARATRIPTAN 2.5 MG/1
2.5 TABLET, FILM COATED ORAL
Qty: 6 | Refills: 2 | Status: ACTIVE | COMMUNITY
Start: 2024-01-11 | End: 1900-01-01

## 2024-01-12 ENCOUNTER — TRANSCRIPTION ENCOUNTER (OUTPATIENT)
Age: 39
End: 2024-01-12

## 2024-01-12 ENCOUNTER — APPOINTMENT (OUTPATIENT)
Dept: GASTROENTEROLOGY | Facility: CLINIC | Age: 39
End: 2024-01-12
Payer: COMMERCIAL

## 2024-01-12 VITALS
SYSTOLIC BLOOD PRESSURE: 122 MMHG | BODY MASS INDEX: 23.16 KG/M2 | DIASTOLIC BLOOD PRESSURE: 74 MMHG | HEART RATE: 86 BPM | OXYGEN SATURATION: 99 % | HEIGHT: 60 IN | TEMPERATURE: 97.3 F | WEIGHT: 118 LBS

## 2024-01-12 DIAGNOSIS — Z82.49 FAMILY HISTORY OF ISCHEMIC HEART DISEASE AND OTHER DISEASES OF THE CIRCULATORY SYSTEM: ICD-10-CM

## 2024-01-12 DIAGNOSIS — D18.03 HEMANGIOMA OF INTRA-ABDOMINAL STRUCTURES: ICD-10-CM

## 2024-01-12 DIAGNOSIS — Z87.09 PERSONAL HISTORY OF OTHER DISEASES OF THE RESPIRATORY SYSTEM: ICD-10-CM

## 2024-01-12 DIAGNOSIS — G43.909 MIGRAINE, UNSPECIFIED, NOT INTRACTABLE, W/OUT STATUS MIGRAINOSUS: ICD-10-CM

## 2024-01-12 DIAGNOSIS — Z80.3 FAMILY HISTORY OF MALIGNANT NEOPLASM OF BREAST: ICD-10-CM

## 2024-01-12 DIAGNOSIS — G43.009 MIGRAINE W/OUT AURA, NOT INTRACTABLE, W/OUT STATUS MIGRAINOSUS: ICD-10-CM

## 2024-01-12 DIAGNOSIS — R93.89 ABNORMAL FINDINGS ON DIAGNOSTIC IMAGING OF OTHER SPECIFIED BODY STRUCTURES: ICD-10-CM

## 2024-01-12 PROCEDURE — 99203 OFFICE O/P NEW LOW 30 MIN: CPT

## 2024-01-12 NOTE — HISTORY OF PRESENT ILLNESS
[de-identified] : The patient had a CAT scan done in 2020 which revealed stability of the hemangioma. [FreeTextEntry1] : The patient had an MRI done a number of years ago which revealed a hemangioma of the liver.

## 2024-01-12 NOTE — REVIEW OF SYSTEMS
[Fever] : no fever [Chills] : no chills [Recent Weight Loss (___ Lbs)] : no recent weight loss [Chest Pain] : no chest pain [Palpitations] : no palpitations [SOB on Exertion] : no shortness of breath during exertion [Abdominal Pain] : no abdominal pain [Vomiting] : no vomiting [Constipation] : no constipation [Diarrhea] : no diarrhea [Heartburn] : no heartburn [Bloating (gassiness)] : no bloating [FreeTextEntry5] : The patient has a known history of a heart murmur.

## 2024-01-12 NOTE — PHYSICAL EXAM
[Alert] : alert [No Acute Distress] : no acute distress [No Respiratory Distress] : no respiratory distress [Auscultation Breath Sounds / Voice Sounds] : lungs were clear to auscultation bilaterally [Bowel Sounds] : normal bowel sounds [No Masses] : no abdominal mass palpated [] : no hepatosplenomegaly [de-identified] : There is a grade 2/6 systolic murmur

## 2024-01-12 NOTE — ASSESSMENT
[FreeTextEntry1] : The patient is a 38-year-old female who generally enjoys good health.  The patient has a history of migraine headaches.  On 2 separate imaging test the patient was found to have had hepatic hemangioma.  There was a mention of fatty liver which did not appear to be borne out on the imaging nor her liver function testing.  The patient has not had any blood test for a number of years but does have a request provided by her primary care physician.  She promised to go to the lab.  I asked the patient to provide me with a copy so I may review her liver function test.  If there is any elevation of the transaminase levels at that point, we may consider reimaging of the liver.  Otherwise, if normal, I would not recommend any routine hepatic imaging.  The patient will call me with any acute changes in her gastrointestinal status.

## 2024-01-28 NOTE — HISTORY OF PRESENT ILLNESS
[FreeTextEntry1] : Pt continues to breast feed but has had frequent headaches. Notes she did poorly for a while. However, had COVID in Oct and then did OK for 2 weeks. however, has had "awful Nov and Dec. Was using the rizatriptan probably too frequently. Notes that she used to do acupuncture and- because she was frustrated- went to new acupuncturist. had been treated with cupping and face down and has started acupuncture since Dec 17th and now back to 1/ week HA's. Has found some variable response to acupuncuture.  Has been back to chiropractor with no clear response. Did use Nerivio device but didin't find it effective. Did try Botox a long time ago but had migriane for 3 weeks.  [Chronic Headache] : chronic headache [Nausea] : nausea [Photophobia] : photophobia [Phonophobia] : phonophobia [Scalp Tenderness] : scalp tenderness [> 15 days per month] : > 15 days per month

## 2024-01-28 NOTE — REVIEW OF SYSTEMS
[Fever] : no fever [Feeling Poorly] : feeling poorly [Feeling Tired] : feeling tired [Eye Pain] : eye pain [Eyesight Problems] : no eyesight problems [Nasal Discharge] : no nasal discharge [Chest Pain] : no chest pain [Palpitations] : no palpitations [Cough] : no cough [Arthralgias] : arthralgias [Skin Lesions] : no skin lesions [Skin Wound] : no skin wound [Itching] : no itching [Confused] : no confusion [Convulsions] : no convulsions [Dizziness] : no dizziness [Fainting] : no fainting [Sleep Disturbances] : sleep disturbances [Muscle Weakness] : no muscle weakness

## 2024-01-28 NOTE — PHYSICAL EXAM
[General Appearance - Alert] : alert [General Appearance - Well Nourished] : well nourished [General Appearance - Well Developed] : well developed [General Appearance - Well-Appearing] : healthy appearing [Oriented To Time, Place, And Person] : oriented to person, place, and time [Impaired Insight] : insight and judgment were intact [Affect] : the affect was normal [Memory Recent] : recent memory was not impaired [Memory Remote] : remote memory was not impaired [Person] : oriented to person [Place] : oriented to place [Time] : oriented to time [Short Term Intact] : short term memory intact [Remote Intact] : remote memory intact [Registration Intact] : recent registration memory intact [Concentration Intact] : normal concentrating ability [Visual Intact] : visual attention was ~T not ~L decreased [Writing A Sentence] : no difficulty writing a sentence [Fluency] : fluency intact [Comprehension] : comprehension intact [Reading] : reading intact [Current Events] : adequate knowledge of current events [Past History] : adequate knowledge of personal past history [Cranial Nerves Oculomotor (III)] : extraocular motion intact [Cranial Nerves Facial (VII)] : face symmetrical [Cranial Nerves Vestibulocochlear (VIII)] : hearing was intact bilaterally [Cranial Nerves Accessory (XI - Cranial And Spinal)] : head turning and shoulder shrug symmetric [Cranial Nerves Hypoglossal (XII)] : there was no tongue deviation with protrusion [No Muscle Atrophy] : normal bulk in all four extremities [Motor Handedness Right-Handed] : the patient is right hand dominant [Motor Strength Upper Extremities Bilaterally] : strength was normal in both upper extremities [Allodynia] : no ~T allodynia present [Abnormal Walk] : normal gait [Tremor] : no tremor present [Dysdiadochokinesia Bilaterally] : not present [Sclera] : the sclera and conjunctiva were normal [No MONSERRAT] : no internuclear ophthalmoplegia [Strabismus] : no strabismus was seen [Hearing Threshold Finger Rub Not [place]] : hearing was normal [Outer Ear] : the ears and nose were normal in appearance [Neck Appearance] : the appearance of the neck was normal [Neck Cervical Mass (___cm)] : no neck mass was observed [Exaggerated Use Of Accessory Muscles For Inspiration] : no accessory muscle use [Nail Clubbing] : no clubbing  or cyanosis of the fingernails [Involuntary Movements] : no involuntary movements were seen [] : no rash

## 2024-01-28 NOTE — ASSESSMENT
[FreeTextEntry1] : trial of change rizatriptan to naratriptan consider change to Nurtec acutely and then transition preventively consider use of cgrp antibody following d/c of breast feeding. Spent 30 min with pt > 50% spent on counseling for no pharmacologic treatment.

## 2024-05-14 ENCOUNTER — NON-APPOINTMENT (OUTPATIENT)
Age: 39
End: 2024-05-14

## 2024-07-04 ENCOUNTER — NON-APPOINTMENT (OUTPATIENT)
Age: 39
End: 2024-07-04

## 2024-08-01 ENCOUNTER — NON-APPOINTMENT (OUTPATIENT)
Age: 39
End: 2024-08-01

## 2024-08-02 ENCOUNTER — TRANSCRIPTION ENCOUNTER (OUTPATIENT)
Age: 39
End: 2024-08-02

## 2024-08-23 NOTE — ED ADULT TRIAGE NOTE - MEANS OF ARRIVAL
FAMILY HISTORY:  Mother  Still living? No  Family history of acute myocardial infarction, Age at diagnosis: 41-50    Sibling  Still living? Unknown  Family history of CHF (congestive heart failure), Age at diagnosis: Age Unknown    Aunt  Still living? Unknown  Family history of early CAD, Age at diagnosis: 51-60    
ambulatory

## 2024-10-23 ENCOUNTER — TRANSCRIPTION ENCOUNTER (OUTPATIENT)
Age: 39
End: 2024-10-23

## 2024-10-24 ENCOUNTER — TRANSCRIPTION ENCOUNTER (OUTPATIENT)
Age: 39
End: 2024-10-24

## 2024-10-25 ENCOUNTER — TRANSCRIPTION ENCOUNTER (OUTPATIENT)
Age: 39
End: 2024-10-25

## 2024-10-28 ENCOUNTER — TRANSCRIPTION ENCOUNTER (OUTPATIENT)
Age: 39
End: 2024-10-28

## 2024-10-28 RX ORDER — GALCANEZUMAB 120 MG/ML
120 INJECTION, SOLUTION SUBCUTANEOUS
Qty: 1 | Refills: 5 | Status: ACTIVE | COMMUNITY
Start: 2024-10-28 | End: 1900-01-01

## 2024-10-28 RX ORDER — GALCANEZUMAB 120 MG/ML
120 INJECTION, SOLUTION SUBCUTANEOUS
Qty: 2 | Refills: 4 | Status: ACTIVE | COMMUNITY
Start: 2024-10-25 | End: 1900-01-01

## 2024-12-09 ENCOUNTER — LABORATORY RESULT (OUTPATIENT)
Age: 39
End: 2024-12-09

## 2024-12-09 ENCOUNTER — APPOINTMENT (OUTPATIENT)
Dept: INTERNAL MEDICINE | Facility: CLINIC | Age: 39
End: 2024-12-09
Payer: COMMERCIAL

## 2024-12-09 VITALS
DIASTOLIC BLOOD PRESSURE: 80 MMHG | RESPIRATION RATE: 18 BRPM | BODY MASS INDEX: 24.15 KG/M2 | WEIGHT: 123 LBS | OXYGEN SATURATION: 98 % | SYSTOLIC BLOOD PRESSURE: 113 MMHG | HEART RATE: 71 BPM | TEMPERATURE: 98.1 F | HEIGHT: 60 IN

## 2024-12-09 DIAGNOSIS — Z12.39 ENCOUNTER FOR OTHER SCREENING FOR MALIGNANT NEOPLASM OF BREAST: ICD-10-CM

## 2024-12-09 DIAGNOSIS — Z00.00 ENCOUNTER FOR GENERAL ADULT MEDICAL EXAMINATION W/OUT ABNORMAL FINDINGS: ICD-10-CM

## 2024-12-09 DIAGNOSIS — D18.03 HEMANGIOMA OF INTRA-ABDOMINAL STRUCTURES: ICD-10-CM

## 2024-12-09 DIAGNOSIS — E55.9 VITAMIN D DEFICIENCY, UNSPECIFIED: ICD-10-CM

## 2024-12-09 DIAGNOSIS — G43.009 MIGRAINE W/OUT AURA, NOT INTRACTABLE, W/OUT STATUS MIGRAINOSUS: ICD-10-CM

## 2024-12-09 DIAGNOSIS — R01.1 CARDIAC MURMUR, UNSPECIFIED: ICD-10-CM

## 2024-12-09 PROCEDURE — 99395 PREV VISIT EST AGE 18-39: CPT

## 2024-12-09 PROCEDURE — 99213 OFFICE O/P EST LOW 20 MIN: CPT | Mod: 25

## 2024-12-10 ENCOUNTER — TRANSCRIPTION ENCOUNTER (OUTPATIENT)
Age: 39
End: 2024-12-10

## 2024-12-10 LAB
25(OH)D3 SERPL-MCNC: 20.3 NG/ML
ALBUMIN SERPL ELPH-MCNC: 4.4 G/DL
ALP BLD-CCNC: 76 U/L
ALT SERPL-CCNC: 11 U/L
ANION GAP SERPL CALC-SCNC: 11 MMOL/L
APPEARANCE: ABNORMAL
AST SERPL-CCNC: 14 U/L
BASOPHILS # BLD AUTO: 0.02 K/UL
BASOPHILS NFR BLD AUTO: 0.3 %
BILIRUB SERPL-MCNC: 0.6 MG/DL
BILIRUBIN URINE: NEGATIVE
BLOOD URINE: NEGATIVE
BUN SERPL-MCNC: 10 MG/DL
CALCIUM SERPL-MCNC: 9.4 MG/DL
CHLORIDE SERPL-SCNC: 102 MMOL/L
CHOLEST SERPL-MCNC: 180 MG/DL
CO2 SERPL-SCNC: 25 MMOL/L
COLOR: YELLOW
CREAT SERPL-MCNC: 0.59 MG/DL
EGFR: 118 ML/MIN/1.73M2
EOSINOPHIL # BLD AUTO: 0.05 K/UL
EOSINOPHIL NFR BLD AUTO: 0.8 %
ESTIMATED AVERAGE GLUCOSE: 91 MG/DL
GLUCOSE QUALITATIVE U: NEGATIVE MG/DL
GLUCOSE SERPL-MCNC: 85 MG/DL
HBA1C MFR BLD HPLC: 4.8 %
HCT VFR BLD CALC: 35.9 %
HDLC SERPL-MCNC: 49 MG/DL
HGB BLD-MCNC: 11.6 G/DL
IMM GRANULOCYTES NFR BLD AUTO: 0 %
KETONES URINE: NEGATIVE MG/DL
LDLC SERPL CALC-MCNC: 117 MG/DL
LEUKOCYTE ESTERASE URINE: NEGATIVE
LYMPHOCYTES # BLD AUTO: 0.9 K/UL
LYMPHOCYTES NFR BLD AUTO: 14.8 %
MAN DIFF?: NORMAL
MCHC RBC-ENTMCNC: 27.7 PG
MCHC RBC-ENTMCNC: 32.3 G/DL
MCV RBC AUTO: 85.7 FL
MONOCYTES # BLD AUTO: 0.69 K/UL
MONOCYTES NFR BLD AUTO: 11.3 %
NEUTROPHILS # BLD AUTO: 4.44 K/UL
NEUTROPHILS NFR BLD AUTO: 72.8 %
NITRITE URINE: NEGATIVE
NONHDLC SERPL-MCNC: 132 MG/DL
PH URINE: 7.5
PLATELET # BLD AUTO: 272 K/UL
POTASSIUM SERPL-SCNC: 3.9 MMOL/L
PROT SERPL-MCNC: 7.2 G/DL
PROTEIN URINE: NEGATIVE MG/DL
RBC # BLD: 4.19 M/UL
RBC # FLD: 13.5 %
SODIUM SERPL-SCNC: 138 MMOL/L
SPECIFIC GRAVITY URINE: 1.01
TRIGL SERPL-MCNC: 79 MG/DL
TSH SERPL-ACNC: 0.99 UIU/ML
UROBILINOGEN URINE: 0.2 MG/DL
WBC # FLD AUTO: 6.1 K/UL

## 2024-12-12 ENCOUNTER — APPOINTMENT (OUTPATIENT)
Dept: PAIN MANAGEMENT | Facility: CLINIC | Age: 39
End: 2024-12-12
Payer: COMMERCIAL

## 2024-12-12 VITALS
SYSTOLIC BLOOD PRESSURE: 110 MMHG | DIASTOLIC BLOOD PRESSURE: 78 MMHG | BODY MASS INDEX: 24.15 KG/M2 | WEIGHT: 123 LBS | HEIGHT: 60 IN | HEART RATE: 65 BPM

## 2024-12-12 PROCEDURE — 99213 OFFICE O/P EST LOW 20 MIN: CPT

## 2024-12-18 NOTE — OB RN PATIENT PROFILE - NS_GBS_INFANT_INVASIVE_OBGYN_ALL_OB_FT
Patient is scheduled for tomorrow, was seen on 12/10 and was given a 12 day taper of prednisone and gabapentin. Was seen in ER yesterday at [facility] and was seen by neuro and cleared for discharged, diagnosed with anxiety.  Can you please triage and see what issue she is being seen for since she was only seen last week. If it is a med issue/side effect we can make recommendations, if it is no improvement in headache I would want her to complete the entire course of prednisone before other suggestions.    Patient states no history

## 2025-01-06 ENCOUNTER — NON-APPOINTMENT (OUTPATIENT)
Age: 40
End: 2025-01-06

## 2025-01-08 ENCOUNTER — NON-APPOINTMENT (OUTPATIENT)
Age: 40
End: 2025-01-08

## 2025-01-08 ENCOUNTER — APPOINTMENT (OUTPATIENT)
Dept: OTOLARYNGOLOGY | Facility: CLINIC | Age: 40
End: 2025-01-08
Payer: COMMERCIAL

## 2025-01-08 VITALS — HEIGHT: 60 IN | BODY MASS INDEX: 24.15 KG/M2 | WEIGHT: 123 LBS

## 2025-01-08 DIAGNOSIS — H61.21 IMPACTED CERUMEN, RIGHT EAR: ICD-10-CM

## 2025-01-08 DIAGNOSIS — H93.8X2 OTHER SPECIFIED DISORDERS OF LEFT EAR: ICD-10-CM

## 2025-01-08 DIAGNOSIS — H92.02 OTALGIA, LEFT EAR: ICD-10-CM

## 2025-01-08 PROCEDURE — 99204 OFFICE O/P NEW MOD 45 MIN: CPT | Mod: 25

## 2025-01-08 PROCEDURE — 92567 TYMPANOMETRY: CPT

## 2025-01-08 PROCEDURE — 92557 COMPREHENSIVE HEARING TEST: CPT

## 2025-01-08 PROCEDURE — G0268 REMOVAL OF IMPACTED WAX MD: CPT

## 2025-01-08 RX ORDER — FLUTICASONE PROPIONATE 50 UG/1
50 SPRAY, METERED NASAL DAILY
Qty: 1 | Refills: 2 | Status: ACTIVE | COMMUNITY
Start: 2025-01-08 | End: 1900-01-01

## 2025-01-08 RX ORDER — METHYLPREDNISOLONE 4 MG/1
4 TABLET ORAL
Qty: 1 | Refills: 0 | Status: ACTIVE | COMMUNITY
Start: 2025-01-08 | End: 1900-01-01

## 2025-01-08 RX ORDER — OFLOXACIN OTIC 3 MG/ML
0.3 SOLUTION AURICULAR (OTIC) TWICE DAILY
Qty: 1 | Refills: 1 | Status: ACTIVE | COMMUNITY
Start: 2025-01-08 | End: 1900-01-01

## 2025-01-14 ENCOUNTER — TRANSCRIPTION ENCOUNTER (OUTPATIENT)
Age: 40
End: 2025-01-14

## 2025-01-15 ENCOUNTER — TRANSCRIPTION ENCOUNTER (OUTPATIENT)
Age: 40
End: 2025-01-15

## 2025-01-15 ENCOUNTER — NON-APPOINTMENT (OUTPATIENT)
Age: 40
End: 2025-01-15

## 2025-01-15 ENCOUNTER — APPOINTMENT (OUTPATIENT)
Dept: OTOLARYNGOLOGY | Facility: CLINIC | Age: 40
End: 2025-01-15

## 2025-02-18 ENCOUNTER — RX RENEWAL (OUTPATIENT)
Age: 40
End: 2025-02-18

## 2025-02-19 ENCOUNTER — TRANSCRIPTION ENCOUNTER (OUTPATIENT)
Age: 40
End: 2025-02-19

## 2025-02-20 ENCOUNTER — TRANSCRIPTION ENCOUNTER (OUTPATIENT)
Age: 40
End: 2025-02-20

## 2025-03-11 ENCOUNTER — TRANSCRIPTION ENCOUNTER (OUTPATIENT)
Age: 40
End: 2025-03-11

## 2025-03-11 RX ORDER — GALCANEZUMAB 120 MG/ML
120 INJECTION, SOLUTION SUBCUTANEOUS
Qty: 1 | Refills: 1 | Status: ACTIVE | COMMUNITY
Start: 2025-03-11 | End: 1900-01-01

## 2025-04-10 ENCOUNTER — TRANSCRIPTION ENCOUNTER (OUTPATIENT)
Age: 40
End: 2025-04-10

## 2025-07-07 ENCOUNTER — TRANSCRIPTION ENCOUNTER (OUTPATIENT)
Age: 40
End: 2025-07-07

## 2025-07-14 ENCOUNTER — NON-APPOINTMENT (OUTPATIENT)
Age: 40
End: 2025-07-14

## 2025-07-16 ENCOUNTER — APPOINTMENT (OUTPATIENT)
Dept: OTOLARYNGOLOGY | Facility: CLINIC | Age: 40
End: 2025-07-16
Payer: COMMERCIAL

## 2025-07-16 VITALS
BODY MASS INDEX: 24.15 KG/M2 | HEIGHT: 60 IN | HEART RATE: 69 BPM | DIASTOLIC BLOOD PRESSURE: 75 MMHG | WEIGHT: 123 LBS | SYSTOLIC BLOOD PRESSURE: 118 MMHG

## 2025-07-16 PROCEDURE — 69210 REMOVE IMPACTED EAR WAX UNI: CPT | Mod: RT

## 2025-07-16 PROCEDURE — 99213 OFFICE O/P EST LOW 20 MIN: CPT | Mod: 25

## 2025-08-06 ENCOUNTER — APPOINTMENT (OUTPATIENT)
Dept: OTOLARYNGOLOGY | Facility: CLINIC | Age: 40
End: 2025-08-06
Payer: COMMERCIAL

## 2025-08-06 VITALS
BODY MASS INDEX: 24.15 KG/M2 | TEMPERATURE: 98.3 F | SYSTOLIC BLOOD PRESSURE: 122 MMHG | DIASTOLIC BLOOD PRESSURE: 80 MMHG | HEART RATE: 61 BPM | WEIGHT: 123 LBS | HEIGHT: 60 IN

## 2025-08-06 DIAGNOSIS — R09.81 NASAL CONGESTION: ICD-10-CM

## 2025-08-06 DIAGNOSIS — H65.91 UNSPECIFIED NONSUPPURATIVE OTITIS MEDIA, RIGHT EAR: ICD-10-CM

## 2025-08-06 DIAGNOSIS — H61.23 IMPACTED CERUMEN, BILATERAL: ICD-10-CM

## 2025-08-06 PROCEDURE — 69210 REMOVE IMPACTED EAR WAX UNI: CPT | Mod: LT,59

## 2025-08-06 PROCEDURE — 99214 OFFICE O/P EST MOD 30 MIN: CPT | Mod: 25

## 2025-08-06 RX ORDER — MOMETASONE 50 UG/1
50 SPRAY, METERED NASAL TWICE DAILY
Qty: 1 | Refills: 5 | Status: ACTIVE | COMMUNITY
Start: 2025-08-06 | End: 1900-01-01

## 2025-08-07 ENCOUNTER — TRANSCRIPTION ENCOUNTER (OUTPATIENT)
Age: 40
End: 2025-08-07

## 2025-08-07 RX ORDER — METHYLPREDNISOLONE 4 MG/1
4 TABLET ORAL
Qty: 1 | Refills: 0 | Status: ACTIVE | COMMUNITY
Start: 2025-08-06 | End: 1900-01-01

## 2025-08-20 ENCOUNTER — APPOINTMENT (OUTPATIENT)
Dept: OTOLARYNGOLOGY | Facility: CLINIC | Age: 40
End: 2025-08-20